# Patient Record
Sex: MALE | Race: WHITE | NOT HISPANIC OR LATINO | URBAN - METROPOLITAN AREA
[De-identification: names, ages, dates, MRNs, and addresses within clinical notes are randomized per-mention and may not be internally consistent; named-entity substitution may affect disease eponyms.]

---

## 2017-01-24 ENCOUNTER — EMERGENCY (EMERGENCY)
Facility: HOSPITAL | Age: 22
LOS: 1 days | Discharge: HOME | End: 2017-01-24
Attending: EMERGENCY MEDICINE | Admitting: EMERGENCY MEDICINE
Payer: COMMERCIAL

## 2017-01-24 VITALS
RESPIRATION RATE: 18 BRPM | OXYGEN SATURATION: 99 % | HEART RATE: 78 BPM | DIASTOLIC BLOOD PRESSURE: 80 MMHG | TEMPERATURE: 99 F | SYSTOLIC BLOOD PRESSURE: 142 MMHG

## 2017-01-24 VITALS
TEMPERATURE: 98 F | SYSTOLIC BLOOD PRESSURE: 141 MMHG | HEART RATE: 79 BPM | DIASTOLIC BLOOD PRESSURE: 97 MMHG | RESPIRATION RATE: 18 BRPM | OXYGEN SATURATION: 99 %

## 2017-01-24 DIAGNOSIS — R42 DIZZINESS AND GIDDINESS: ICD-10-CM

## 2017-01-24 DIAGNOSIS — R00.2 PALPITATIONS: ICD-10-CM

## 2017-01-24 PROCEDURE — 93010 ELECTROCARDIOGRAM REPORT: CPT | Mod: NC

## 2017-01-24 PROCEDURE — 99283 EMERGENCY DEPT VISIT LOW MDM: CPT | Mod: 25

## 2017-01-24 PROCEDURE — 93005 ELECTROCARDIOGRAM TRACING: CPT

## 2017-01-24 PROCEDURE — 99284 EMERGENCY DEPT VISIT MOD MDM: CPT | Mod: 25

## 2017-01-24 RX ORDER — MECLIZINE HCL 12.5 MG
25 TABLET ORAL ONCE
Qty: 0 | Refills: 0 | Status: COMPLETED | OUTPATIENT
Start: 2017-01-24 | End: 2017-01-24

## 2017-01-24 RX ORDER — MECLIZINE HCL 12.5 MG
1 TABLET ORAL
Qty: 9 | Refills: 0
Start: 2017-01-24 | End: 2017-01-27

## 2017-01-24 RX ADMIN — Medication 25 MILLIGRAM(S): at 02:06

## 2017-01-24 NOTE — ED PROVIDER NOTE - MEDICAL DECISION MAKING DETAILS
21 year old M presenting with acute onset dizziness this evening when laying flat, felt room spinning. Dizziness worse with movement. Associated nausea, palpitations. patient now only with dizziness. no fever or chills. no numbness weakness, vision changes. No recent illness. no chest pain or shortness of breath.   likely BPPPV given acute onset and worse with movement and otherwise healthy male. plan for antivert and reassess.

## 2017-01-24 NOTE — ED PROVIDER NOTE - INTERPRETATION
normal sinus rhythm, Normal axis, Normal VT interval and QRS complex. There are no acute ischemic ST or T-wave changes.

## 2017-01-24 NOTE — ED PROVIDER NOTE - PROGRESS NOTE DETAILS
Patients symptoms have improved. In bed and comfortable. Lengthy discussion regarding treatment, discharge instructions, and need for follow up. Patient understands and wishes to go home. LACY Thurman MD

## 2017-01-24 NOTE — ED ADULT NURSE NOTE - OBJECTIVE STATEMENT
22 y/o male presents to ED complaining of feeling his heart beating out of his chest and dizziness. States this started 2 hours ago. Denies SOB, chest pain, N/V/D, fever or chills. A&Ox4, VSS, ABD soft nondistended, skin warm dry and intact, lungs CTA. EKG obtained, placed on cardiac monitor.

## 2017-01-24 NOTE — ED ADULT NURSE NOTE - CHPI ED SYMPTOMS NEG
no fever/no syncope/no nausea/no back pain/no vomiting/no cough/no chills/no diaphoresis/no shortness of breath

## 2017-01-24 NOTE — ED PROVIDER NOTE - PHYSICAL EXAMINATION
Well Appearing, Nontoxic, NAD;  Symm Facies, PERRL 3mm, (-)Pallor, Anicteric, MMM;  No JVD/Bruits or stridor;  RRR w/o m/g/r;   CTAB w/o w/r/r;   Abd soft, nt/nd, +bs;  No CVAT;  No edema/calf tender;  No rash;  AOX3, Normal speech, normal strength/sensation/gait

## 2017-01-31 PROBLEM — Z00.00 ENCOUNTER FOR PREVENTIVE HEALTH EXAMINATION: Status: ACTIVE | Noted: 2017-01-31

## 2019-01-27 ENCOUNTER — EMERGENCY (EMERGENCY)
Facility: HOSPITAL | Age: 24
LOS: 1 days | Discharge: HOME | End: 2019-01-27
Attending: EMERGENCY MEDICINE
Payer: COMMERCIAL

## 2019-01-27 VITALS
SYSTOLIC BLOOD PRESSURE: 172 MMHG | RESPIRATION RATE: 17 BRPM | OXYGEN SATURATION: 97 % | HEART RATE: 95 BPM | TEMPERATURE: 99 F | DIASTOLIC BLOOD PRESSURE: 119 MMHG

## 2019-01-27 VITALS
DIASTOLIC BLOOD PRESSURE: 91 MMHG | RESPIRATION RATE: 18 BRPM | OXYGEN SATURATION: 97 % | HEART RATE: 96 BPM | SYSTOLIC BLOOD PRESSURE: 133 MMHG

## 2019-01-27 DIAGNOSIS — F39 UNSPECIFIED MOOD [AFFECTIVE] DISORDER: ICD-10-CM

## 2019-01-27 LAB
ALBUMIN SERPL ELPH-MCNC: 4.8 G/DL — SIGNIFICANT CHANGE UP (ref 3.3–5)
ALP SERPL-CCNC: 72 U/L — SIGNIFICANT CHANGE UP (ref 40–120)
ALT FLD-CCNC: 20 U/L — SIGNIFICANT CHANGE UP (ref 10–45)
ANION GAP SERPL CALC-SCNC: 15 MMOL/L — SIGNIFICANT CHANGE UP (ref 5–17)
APAP SERPL-MCNC: <15 UG/ML — SIGNIFICANT CHANGE UP (ref 10–30)
AST SERPL-CCNC: 18 U/L — SIGNIFICANT CHANGE UP (ref 10–40)
BASOPHILS # BLD AUTO: 0.1 K/UL — SIGNIFICANT CHANGE UP (ref 0–0.2)
BASOPHILS NFR BLD AUTO: 0.4 % — SIGNIFICANT CHANGE UP (ref 0–2)
BILIRUB SERPL-MCNC: 0.6 MG/DL — SIGNIFICANT CHANGE UP (ref 0.2–1.2)
BUN SERPL-MCNC: 13 MG/DL — SIGNIFICANT CHANGE UP (ref 7–23)
CALCIUM SERPL-MCNC: 9.6 MG/DL — SIGNIFICANT CHANGE UP (ref 8.4–10.5)
CHLORIDE SERPL-SCNC: 102 MMOL/L — SIGNIFICANT CHANGE UP (ref 96–108)
CO2 SERPL-SCNC: 21 MMOL/L — LOW (ref 22–31)
CREAT SERPL-MCNC: 0.95 MG/DL — SIGNIFICANT CHANGE UP (ref 0.5–1.3)
EOSINOPHIL # BLD AUTO: 0 K/UL — SIGNIFICANT CHANGE UP (ref 0–0.5)
EOSINOPHIL NFR BLD AUTO: 0.2 % — SIGNIFICANT CHANGE UP (ref 0–6)
ETHANOL SERPL-MCNC: SIGNIFICANT CHANGE UP MG/DL (ref 0–10)
GLUCOSE SERPL-MCNC: 116 MG/DL — HIGH (ref 70–99)
HCT VFR BLD CALC: 44.9 % — SIGNIFICANT CHANGE UP (ref 39–50)
HGB BLD-MCNC: 15.5 G/DL — SIGNIFICANT CHANGE UP (ref 13–17)
LYMPHOCYTES # BLD AUTO: 1.5 K/UL — SIGNIFICANT CHANGE UP (ref 1–3.3)
LYMPHOCYTES # BLD AUTO: 9.5 % — LOW (ref 13–44)
MCHC RBC-ENTMCNC: 28.8 PG — SIGNIFICANT CHANGE UP (ref 27–34)
MCHC RBC-ENTMCNC: 34.5 GM/DL — SIGNIFICANT CHANGE UP (ref 32–36)
MCV RBC AUTO: 83.7 FL — SIGNIFICANT CHANGE UP (ref 80–100)
MONOCYTES # BLD AUTO: 1.4 K/UL — HIGH (ref 0–0.9)
MONOCYTES NFR BLD AUTO: 9 % — SIGNIFICANT CHANGE UP (ref 2–14)
NEUTROPHILS # BLD AUTO: 12.5 K/UL — HIGH (ref 1.8–7.4)
NEUTROPHILS NFR BLD AUTO: 81 % — HIGH (ref 43–77)
PLATELET # BLD AUTO: 281 K/UL — SIGNIFICANT CHANGE UP (ref 150–400)
POTASSIUM SERPL-MCNC: 4 MMOL/L — SIGNIFICANT CHANGE UP (ref 3.5–5.3)
POTASSIUM SERPL-SCNC: 4 MMOL/L — SIGNIFICANT CHANGE UP (ref 3.5–5.3)
PROT SERPL-MCNC: 7.6 G/DL — SIGNIFICANT CHANGE UP (ref 6–8.3)
RBC # BLD: 5.37 M/UL — SIGNIFICANT CHANGE UP (ref 4.2–5.8)
RBC # FLD: 13.5 % — SIGNIFICANT CHANGE UP (ref 10.3–14.5)
SALICYLATES SERPL-MCNC: <2 MG/DL — LOW (ref 15–30)
SODIUM SERPL-SCNC: 138 MMOL/L — SIGNIFICANT CHANGE UP (ref 135–145)
WBC # BLD: 15.4 K/UL — HIGH (ref 3.8–10.5)
WBC # FLD AUTO: 15.4 K/UL — HIGH (ref 3.8–10.5)

## 2019-01-27 PROCEDURE — 99284 EMERGENCY DEPT VISIT MOD MDM: CPT

## 2019-01-27 PROCEDURE — 93005 ELECTROCARDIOGRAM TRACING: CPT

## 2019-01-27 PROCEDURE — 80307 DRUG TEST PRSMV CHEM ANLYZR: CPT

## 2019-01-27 PROCEDURE — 80053 COMPREHEN METABOLIC PANEL: CPT

## 2019-01-27 PROCEDURE — 85027 COMPLETE CBC AUTOMATED: CPT

## 2019-01-27 PROCEDURE — 90792 PSYCH DIAG EVAL W/MED SRVCS: CPT

## 2019-01-27 PROCEDURE — 99285 EMERGENCY DEPT VISIT HI MDM: CPT

## 2019-01-27 NOTE — ED BEHAVIORAL HEALTH ASSESSMENT NOTE - PREFERRED LANGUAGE
CAD (coronary artery disease)  stents  DM (diabetes mellitus)  pre  Fatty liver    Former smoker    Gastroesophageal reflux disease, esophagitis presence not specified    HLD (hyperlipidemia)    HTN (hypertension)    MI (myocardial infarction) English

## 2019-01-27 NOTE — ED PROVIDER NOTE - PROGRESS NOTE DETAILS
Patient seen by psych. Do not feel he needs 2pc psych admission. Offered voluntary psych admission, initially patient accepted, but then did not want to wait for bed. Psysch spoke again with patient and family, and they do not feel he is an acute threat to himself or others, and feels safe taking him home. Will dc with outpatient psych, and [facility] [place] [facility] - d/w psych attending, pt is unwilling to stay. wait for bed. Safe for DC though, will f/u Psych and restart psych meds. Parents at bedside, agree w plan. Will DC
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
## 2019-01-27 NOTE — ED PROVIDER NOTE - NSFOLLOWUPINSTRUCTIONS_ED_ALL_ED_FT
Take your Lexapro as prescribed.  Follow up with Psychiatrist as discussed next week.  Return to the ER for any concerns.

## 2019-01-27 NOTE — ED BEHAVIORAL HEALTH ASSESSMENT NOTE - CURRENT ACTIVE IDEATION
Vaccine Information Statement(s) was given today. This has been reviewed, questions answered, and verbal consent given by Patient for injection(s) and administration of Tetanus/Diphtheria/Pertussis (Tdap). Patient tolerated without incident. See immunization grid for documentation. None known
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
## 2019-01-27 NOTE — ED BEHAVIORAL HEALTH ASSESSMENT NOTE - DESCRIPTION
Denies college degree in economics; works at [employer]; live with family Vital Signs Last 24 Hrs  T(C): 37 (27 Jan 2019 14:33), Max: 37 (27 Jan 2019 14:33)  T(F): 98.6 (27 Jan 2019 14:33), Max: 98.6 (27 Jan 2019 14:33)  HR: 96 (27 Jan 2019 14:49) (95 - 96)  BP: 133/91 (27 Jan 2019 14:49) (133/91 - 172/119)  BP(mean): --  RR: 18 (27 Jan 2019 14:49) (17 - 18)  SpO2: 97% (27 Jan 2019 14:49) (97% - 97%) college degree in economics; works at [employer]; lives with family

## 2019-01-27 NOTE — ED PROVIDER NOTE - CARE PROVIDER_API CALL
Nathan Garcia), Psychiatry  [address] [place] [address]  Phone: [phone]  Fax: [phone]

## 2019-01-27 NOTE — ED BEHAVIORAL HEALTH ASSESSMENT NOTE - DIFFERENTIAL
MDD vs PDD  ?hypomania or mohit  Substance-induced mood disorder  BLPD vs avoidant personality disorder MDD vs PDD  ?hypomania or mohit  Substance-induced mood disorder  Generalized anxiety disorder vs social anxiety disorder  BLPD vs avoidant personality disorder MDD vs PDD  Substance-induced mood disorder  Generalized anxiety disorder vs social anxiety disorder  BLPD vs avoidant personality disorder

## 2019-01-27 NOTE — ED ADULT NURSE NOTE - OBJECTIVE STATEMENT
23 year old male BIB mother with depression and axiety; A&O; +SI with no plan or intent at this time; denies AVH; + THC use last Friday, social drinker, denies any other drug use;  denies Axis III; NKA. Pt states "I hate everything about myself" and states he has been having SI "almost every day since high school" with one prior attempt by "trying to jump from a balcony" when he was 16; is on Lexapro but is not currently seeing a psychiatrist or therapist, denies any prior admits; pt states he smoked some THC oil on Friday and his mother states this is part of when these feelings became worse, pt agrees somewhat but also says these feelings predate any drug use; upon arrival he is cooperative somewhat anxious with pressured speech. Security applied metal detection and mother took belongings. Continue to monitor.

## 2019-01-27 NOTE — ED BEHAVIORAL HEALTH ASSESSMENT NOTE - RISK ASSESSMENT
Pt is at mildly elevated risk for harm towards self/others and does not meet criteria for involuntary admission to inpatient psych. Pt w/o current active SIIP or HIIP but seeking treatment for current symptoms. Pt seeking voluntary admission to inpatient psychiatry. Pt is at elevated chronic risk for harm towards self/others. Pt w/o current active SIIP or HIIP but seeking treatment for depression and anxiety. Pt at this time does not meet criteria for involuntary admission to inpatient psych. Pt seeking outpatient psych tx and parents in agreement.

## 2020-01-16 ENCOUNTER — EMERGENCY (EMERGENCY)
Facility: HOSPITAL | Age: 25
LOS: 1 days | Discharge: PSYCHIATRIC OR SUBSTANCE USE FACILITY | End: 2020-01-16
Attending: EMERGENCY MEDICINE
Payer: COMMERCIAL

## 2020-01-16 ENCOUNTER — INPATIENT (INPATIENT)
Facility: HOSPITAL | Age: 25
LOS: 4 days | Discharge: HOME | End: 2020-01-21
Attending: PSYCHIATRY & NEUROLOGY | Admitting: PSYCHIATRY & NEUROLOGY
Payer: COMMERCIAL

## 2020-01-16 VITALS
SYSTOLIC BLOOD PRESSURE: 156 MMHG | OXYGEN SATURATION: 98 % | TEMPERATURE: 98 F | DIASTOLIC BLOOD PRESSURE: 102 MMHG | HEART RATE: 76 BPM | RESPIRATION RATE: 18 BRPM | WEIGHT: 315 LBS | HEIGHT: 75 IN

## 2020-01-16 VITALS — HEIGHT: 75 IN | TEMPERATURE: 98 F | WEIGHT: 315 LBS

## 2020-01-16 VITALS
HEART RATE: 79 BPM | TEMPERATURE: 99 F | RESPIRATION RATE: 18 BRPM | OXYGEN SATURATION: 96 % | DIASTOLIC BLOOD PRESSURE: 95 MMHG | SYSTOLIC BLOOD PRESSURE: 131 MMHG

## 2020-01-16 DIAGNOSIS — F33.2 MAJOR DEPRESSIVE DISORDER, RECURRENT SEVERE WITHOUT PSYCHOTIC FEATURES: ICD-10-CM

## 2020-01-16 DIAGNOSIS — F33.9 MAJOR DEPRESSIVE DISORDER, RECURRENT, UNSPECIFIED: ICD-10-CM

## 2020-01-16 LAB
ALBUMIN SERPL ELPH-MCNC: 4.6 G/DL — SIGNIFICANT CHANGE UP (ref 3.3–5)
ALP SERPL-CCNC: 91 U/L — SIGNIFICANT CHANGE UP (ref 40–120)
ALT FLD-CCNC: 22 U/L — SIGNIFICANT CHANGE UP (ref 10–45)
AMPHET UR-MCNC: NEGATIVE — SIGNIFICANT CHANGE UP
ANION GAP SERPL CALC-SCNC: 14 MMOL/L — SIGNIFICANT CHANGE UP (ref 5–17)
APAP SERPL-MCNC: <15 UG/ML — SIGNIFICANT CHANGE UP (ref 10–30)
APPEARANCE UR: CLEAR — SIGNIFICANT CHANGE UP
AST SERPL-CCNC: 16 U/L — SIGNIFICANT CHANGE UP (ref 10–40)
BACTERIA # UR AUTO: NEGATIVE — SIGNIFICANT CHANGE UP
BARBITURATES UR SCN-MCNC: NEGATIVE — SIGNIFICANT CHANGE UP
BASOPHILS # BLD AUTO: 0.07 K/UL — SIGNIFICANT CHANGE UP (ref 0–0.2)
BASOPHILS NFR BLD AUTO: 0.6 % — SIGNIFICANT CHANGE UP (ref 0–2)
BENZODIAZ UR-MCNC: NEGATIVE — SIGNIFICANT CHANGE UP
BILIRUB SERPL-MCNC: 0.6 MG/DL — SIGNIFICANT CHANGE UP (ref 0.2–1.2)
BILIRUB UR-MCNC: NEGATIVE — SIGNIFICANT CHANGE UP
BUN SERPL-MCNC: 11 MG/DL — SIGNIFICANT CHANGE UP (ref 7–23)
CALCIUM SERPL-MCNC: 9.8 MG/DL — SIGNIFICANT CHANGE UP (ref 8.4–10.5)
CHLORIDE SERPL-SCNC: 101 MMOL/L — SIGNIFICANT CHANGE UP (ref 96–108)
CO2 SERPL-SCNC: 22 MMOL/L — SIGNIFICANT CHANGE UP (ref 22–31)
COCAINE METAB.OTHER UR-MCNC: NEGATIVE — SIGNIFICANT CHANGE UP
COLOR SPEC: SIGNIFICANT CHANGE UP
CREAT SERPL-MCNC: 0.86 MG/DL — SIGNIFICANT CHANGE UP (ref 0.5–1.3)
DIFF PNL FLD: NEGATIVE — SIGNIFICANT CHANGE UP
EOSINOPHIL # BLD AUTO: 0.08 K/UL — SIGNIFICANT CHANGE UP (ref 0–0.5)
EOSINOPHIL NFR BLD AUTO: 0.7 % — SIGNIFICANT CHANGE UP (ref 0–6)
EPI CELLS # UR: 0 /HPF — SIGNIFICANT CHANGE UP
ETHANOL SERPL-MCNC: SIGNIFICANT CHANGE UP MG/DL (ref 0–10)
GLUCOSE SERPL-MCNC: 108 MG/DL — HIGH (ref 70–99)
GLUCOSE UR QL: NEGATIVE — SIGNIFICANT CHANGE UP
HCT VFR BLD CALC: 47.7 % — SIGNIFICANT CHANGE UP (ref 39–50)
HGB BLD-MCNC: 16.1 G/DL — SIGNIFICANT CHANGE UP (ref 13–17)
HYALINE CASTS # UR AUTO: 0 /LPF — SIGNIFICANT CHANGE UP (ref 0–2)
IMM GRANULOCYTES NFR BLD AUTO: 0.4 % — SIGNIFICANT CHANGE UP (ref 0–1.5)
KETONES UR-MCNC: NEGATIVE — SIGNIFICANT CHANGE UP
LEUKOCYTE ESTERASE UR-ACNC: NEGATIVE — SIGNIFICANT CHANGE UP
LYMPHOCYTES # BLD AUTO: 1.52 K/UL — SIGNIFICANT CHANGE UP (ref 1–3.3)
LYMPHOCYTES # BLD AUTO: 12.9 % — LOW (ref 13–44)
MCHC RBC-ENTMCNC: 28 PG — SIGNIFICANT CHANGE UP (ref 27–34)
MCHC RBC-ENTMCNC: 33.8 GM/DL — SIGNIFICANT CHANGE UP (ref 32–36)
MCV RBC AUTO: 83 FL — SIGNIFICANT CHANGE UP (ref 80–100)
METHADONE UR-MCNC: NEGATIVE — SIGNIFICANT CHANGE UP
MONOCYTES # BLD AUTO: 0.74 K/UL — SIGNIFICANT CHANGE UP (ref 0–0.9)
MONOCYTES NFR BLD AUTO: 6.3 % — SIGNIFICANT CHANGE UP (ref 2–14)
NEUTROPHILS # BLD AUTO: 9.33 K/UL — HIGH (ref 1.8–7.4)
NEUTROPHILS NFR BLD AUTO: 79.1 % — HIGH (ref 43–77)
NITRITE UR-MCNC: NEGATIVE — SIGNIFICANT CHANGE UP
NRBC # BLD: 0 /100 WBCS — SIGNIFICANT CHANGE UP (ref 0–0)
OPIATES UR-MCNC: NEGATIVE — SIGNIFICANT CHANGE UP
OXYCODONE UR-MCNC: NEGATIVE — SIGNIFICANT CHANGE UP
PCP SPEC-MCNC: SIGNIFICANT CHANGE UP
PCP UR-MCNC: NEGATIVE — SIGNIFICANT CHANGE UP
PH UR: 6 — SIGNIFICANT CHANGE UP (ref 5–8)
PLATELET # BLD AUTO: 289 K/UL — SIGNIFICANT CHANGE UP (ref 150–400)
POTASSIUM SERPL-MCNC: 4 MMOL/L — SIGNIFICANT CHANGE UP (ref 3.5–5.3)
POTASSIUM SERPL-SCNC: 4 MMOL/L — SIGNIFICANT CHANGE UP (ref 3.5–5.3)
PROT SERPL-MCNC: 8.1 G/DL — SIGNIFICANT CHANGE UP (ref 6–8.3)
PROT UR-MCNC: NEGATIVE — SIGNIFICANT CHANGE UP
RBC # BLD: 5.75 M/UL — SIGNIFICANT CHANGE UP (ref 4.2–5.8)
RBC # FLD: 12.6 % — SIGNIFICANT CHANGE UP (ref 10.3–14.5)
RBC CASTS # UR COMP ASSIST: 1 /HPF — SIGNIFICANT CHANGE UP (ref 0–4)
SALICYLATES SERPL-MCNC: <2 MG/DL — LOW (ref 15–30)
SODIUM SERPL-SCNC: 137 MMOL/L — SIGNIFICANT CHANGE UP (ref 135–145)
SP GR SPEC: 1.01 — SIGNIFICANT CHANGE UP (ref 1.01–1.02)
THC UR QL: POSITIVE
TSH SERPL-MCNC: 1.84 UIU/ML — SIGNIFICANT CHANGE UP (ref 0.27–4.2)
UROBILINOGEN FLD QL: NEGATIVE — SIGNIFICANT CHANGE UP
WBC # BLD: 11.79 K/UL — HIGH (ref 3.8–10.5)
WBC # FLD AUTO: 11.79 K/UL — HIGH (ref 3.8–10.5)
WBC UR QL: 1 /HPF — SIGNIFICANT CHANGE UP (ref 0–5)

## 2020-01-16 PROCEDURE — 99285 EMERGENCY DEPT VISIT HI MDM: CPT

## 2020-01-16 PROCEDURE — 80307 DRUG TEST PRSMV CHEM ANLYZR: CPT

## 2020-01-16 PROCEDURE — 84443 ASSAY THYROID STIM HORMONE: CPT

## 2020-01-16 PROCEDURE — 99284 EMERGENCY DEPT VISIT MOD MDM: CPT

## 2020-01-16 PROCEDURE — 85027 COMPLETE CBC AUTOMATED: CPT

## 2020-01-16 PROCEDURE — 81001 URINALYSIS AUTO W/SCOPE: CPT

## 2020-01-16 PROCEDURE — 93005 ELECTROCARDIOGRAM TRACING: CPT

## 2020-01-16 PROCEDURE — 80053 COMPREHEN METABOLIC PANEL: CPT

## 2020-01-16 RX ORDER — DIPHENHYDRAMINE HCL 50 MG
50 CAPSULE ORAL ONCE
Refills: 0 | Status: DISCONTINUED | OUTPATIENT
Start: 2020-01-16 | End: 2020-01-21

## 2020-01-16 RX ORDER — DIPHENHYDRAMINE HCL 50 MG
50 CAPSULE ORAL EVERY 6 HOURS
Refills: 0 | Status: DISCONTINUED | OUTPATIENT
Start: 2020-01-16 | End: 2020-01-21

## 2020-01-16 RX ORDER — HALOPERIDOL DECANOATE 100 MG/ML
5 INJECTION INTRAMUSCULAR EVERY 6 HOURS
Refills: 0 | Status: DISCONTINUED | OUTPATIENT
Start: 2020-01-16 | End: 2020-01-21

## 2020-01-16 RX ORDER — HALOPERIDOL DECANOATE 100 MG/ML
5 INJECTION INTRAMUSCULAR ONCE
Refills: 0 | Status: DISCONTINUED | OUTPATIENT
Start: 2020-01-16 | End: 2020-01-21

## 2020-01-16 RX ORDER — VENLAFAXINE HCL 75 MG
75 CAPSULE, EXT RELEASE 24 HR ORAL DAILY
Refills: 0 | Status: DISCONTINUED | OUTPATIENT
Start: 2020-01-16 | End: 2020-01-18

## 2020-01-16 NOTE — ED PROVIDER NOTE - CLINICAL SUMMARY MEDICAL DECISION MAKING FREE TEXT BOX
24M w/ worsening SI, no medical complaints, will eval w/ labs and consult psych 24M w/ worsening SI, no medical complaints, will eval w/ labs and consult psych    MIGUEL Pink MD: Pt is a 23 y/o male with PMH depression/anxiety on effexor rx'd by PMD, presenting w/ feelings of suicidal ideation ongoing x months, recently worsening with more concrete plans such as jumping off a bridge and using a helium mask to commit suicide. Never attempted suicide. Punched himself in the head today to try to 'make it all stop', no LOC, no endorsement of motor/neuro symptoms. No new stressors. Lives with family and feels supported but states these feelings are unexplained and he feels that they are more frequent and worsening. Plan: Psych clearance labs, Psych consult

## 2020-01-16 NOTE — ED BEHAVIORAL HEALTH ASSESSMENT NOTE - SUICIDE RISK FACTORS
Anhedonia/Mood Disorder current/past/Current mood episode/Hopelessness or despair/Alcohol/Substance abuse disorders

## 2020-01-16 NOTE — ED PROVIDER NOTE - NS ED ROS FT
Gen: No fever, normal appetite  Eyes: No eye irritation or discharge  ENT: No ear pain, congestion, sore throat  Resp: No cough or trouble breathing  Cardiovascular: No chest pain or palpitation  Gastroenteric: No nausea/vomiting, diarrhea, constipation  :  No change in urine output; no dysuria  MS: No joint or muscle pain  Skin: No rashes  Neuro: No headache; no abnormal movements  Psych: + SI, no HI/AH, no HI   Remainder negative, except as per the HPI

## 2020-01-16 NOTE — ED ADULT NURSE NOTE - OBJECTIVE STATEMENT
24 yr old pt BIB self and parents with SI; A&O; +SI, safe in hospital; denies AVH; denies ETOH, uses cannabis daily; denies Axis III. 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 24 yr old pt BIB self and parents with SI; A&O; +SI, safe in hospital; denies AVH; denies ETOH, "used to use cannabis daily but I cut down"; denies Axis III. 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

## 2020-01-16 NOTE — ED PROVIDER NOTE - PHYSICAL EXAMINATION
Gen: AAOX3, NAD  Head: NCAT  ENT: Airway patent, moist mucous membranes, nasal passageways clear  Cardiac: Normal rate, normal rhythm, no murmurs/rubs/gallops appreciated  Respiratory: Lungs CTA B/L  Gastrointestinal: Abdomen soft, nontender, nondistended, no rebound, no guarding   MSK: No gross abnormalities, FROM of all four extremities, no edema  HEME: Extremities warm, pulses intact and symmetrical in all four extremities  Skin: No rashes, no lesions  Neuro: No gross neurologic deficits, CN II-XII intact, no facial asymmetry, strength equal in all four extremities, no gait abnormality

## 2020-01-16 NOTE — ED ADULT NURSE NOTE - SUICIDE RISK FACTORS
Insomnia/Hopelessness or despair/Mood Disorder current/past/ADHD current/past/Current mood episode/Anhedonia

## 2020-01-16 NOTE — ED BEHAVIORAL HEALTH ASSESSMENT NOTE - SUMMARY
This is a 25 yo M with no PMH, no past psychiatric hospitalizations or diagnoses, significant past history of suicidal ideation and depression, presenting to ER for suicidality. Pt woke up this morning and was driving to work when he began feeling anger for having a mental illness and returned home from work. He was having strong suicidal ideation with plan but no intent, thinking about using a helium tank to suffocate himself or jump off a bridge. He then remained at home for a few hours and decided to come to the ED because he believed he would eventually kill himself if he did not seek help. Psychiatry consulted for suicidality and depression.    On evaluation, patient reported feeling in good health until 5th grade, during which his teacher bullied and mocked him. He soon lost interest in school and began to spend more time playing video games and watching TV. However, his academic performance did not suffer and he did well in school with minimal effort. During this time, he began to experience depressed mood every day for most of the day, significant anhedonia, sleep disturbance, guilt, concentration impairment and daily passive suicidal ideation with no intent but plans including using a helium tank and jumping off a bridge. He also harmed himself often by slapping himself in the head due to feeling a need to be punished. He also endorsed anxiety at this time. He also began to consume marijuana in binges, smoking daily for several weeks at a time with months in between binges. He acknowledges he abuses THC but does not intend to completely stop, although he states he can decrease his frequency of use. He was on an SSRI medication for about a year but stopped taking it in January 2018. Approximately a year later, he experienced a manic episode- 7 days of sleeplessness, increased energy, pressured speech, grandiosity. He notes some improvement in depressive symptoms from the SSRI but felt it made him listless and robotic. He has been on Effexor for the last year and notes similar response, preferring not to take medication. He has been attending CBT psychotherapy weekly for the last 8 months but finds it ineffective. His depression had some mild improvement during college since he had work to occupy his time and goals to accomplish, but depression became more severe after he graduated. He endorses vague possible visual hallucination but notes it may be due to his frequent marijuana use. Patient believes he will kill himself if he does not seek psychiatric treatment. There is family psychiatric history of alcoholism. Denies current SI/HI.

## 2020-01-16 NOTE — ED BEHAVIORAL HEALTH ASSESSMENT NOTE - DETAILS
Researched methods of suicide extensively but never acted on them Alcoholism As above To 2N Plan communicated

## 2020-01-16 NOTE — ED PROVIDER NOTE - OBJECTIVE STATEMENT
24M hx depression/anxiety on effexor rx'd by PMD, presenting w/ feelings of suicidal ideation ongoing x months, recently worsening with more concrete plans such as jumping off a bridge and using a helium mask to commit suicide. Never attempted suicide. Punched himself in the head today to try to 'make it all stop', no LOC, no endorsement of motor/neuro symptoms. No new stressors. Lives with family and feels supported but states these feelings are unexplained and he feels that they are more frequent and worsening.

## 2020-01-16 NOTE — ED BEHAVIORAL HEALTH ASSESSMENT NOTE - HPI (INCLUDE ILLNESS QUALITY, SEVERITY, DURATION, TIMING, CONTEXT, MODIFYING FACTORS, ASSOCIATED SIGNS AND SYMPTOMS)
This is a 23 yo M with no PMH, no past psychiatric hospitalizations or diagnoses, significant past history of suicidal ideation and depression, presenting to ER for suicidality. Pt woke up this morning and was driving to work when he began feeling anger for having a mental illness and returned home from work. He was having strong suicidal ideation with plan but no intent, thinking about using a helium tank to suffocate himself or jump off a bridge. He then remained at home for a few hours and decided to come to the ED because he believed he would eventually kill himself if he did not seek help. Psychiatry consulted for suicidality and depression.    On evaluation, patient reported feeling in good health until 5th grade, during which his teacher bullied and mocked him. He soon lost interest in school and began to spend more time playing video games and watching TV. However, his academic performance did not suffer and he did well in school with minimal effort. During this time, he began to experience depressed mood every day for most of the day, significant anhedonia, sleep disturbance, guilt, concentration impairment and daily passive suicidal ideation with no intent but plans including using a helium tank and jumping off a bridge. He also harmed himself often by slapping himself in the head due to feeling a need to be punished. He also endorsed anxiety at this time. He also began to consume marijuana in binges, smoking daily for several weeks at a time with months in between binges. He acknowledges he abuses THC but does not intend to completely stop, although he states he can decrease his frequency of use. He was on an SSRI medication for about a year but stopped taking it in January 2018. Approximately a year later, he experienced a manic episode- 7 days of sleeplessness, increased energy, pressured speech, grandiosity. He notes some improvement in depressive symptoms from the SSRI but felt it made him listless and robotic. He has been on Effexor for the last year and notes similar response, preferring not to take medication. He has been attending CBT psychotherapy weekly for the last 8 months but finds it ineffective. His depression had some mild improvement during college since he had work to occupy his time and goals to accomplish, but depression became more severe after he graduated. He endorses vague possible visual hallucination but notes it may be due to his frequent marijuana use. Patient believes he will kill himself if he does not seek psychiatric treatment. There is family psychiatric history of alcoholism. Denies current SI/HI.    Collateral from father Nando Echevarria () was in accord with patient's report. They stated he was brought to the ER 1 year ago for anger and property destruction at home after 2 of his friends cut off communication with him. He was advised to follow up with [facility] outpatient but did not do so. This is a 25 yo CM patient with no PMH, no past psychiatric hospitalizations or diagnoses, significant past history of suicidal ideation and depression, presenting to ER for suicidality. Pt woke up this morning and was driving to work when he began feeling anger for having a mental illness and returned home from work. He was having strong suicidal ideation with plan but no intent, thinking about using a helium tank to suffocate himself or jump off a bridge. He then remained at home for a few hours and decided to come to the ED because he believed he would eventually kill himself if he did not seek help. Psychiatry consulted for suicidality and depression.    On evaluation, patient reported feeling in good health until 5th grade, during which his teacher bullied and mocked him. He soon lost interest in school and began to spend more time playing video games and watching TV. However, his academic performance did not suffer and he did well in school with minimal effort. During this time, he began to experience depressed mood every day for most of the day, significant anhedonia, sleep disturbance, guilt, concentration impairment and daily passive suicidal ideation with no intent but plans including using a helium tank and jumping off a bridge. He also harmed himself often by slapping himself in the head due to feeling a need to be punished. He also endorsed anxiety at this time. He also began to consume marijuana in binges, smoking daily for several weeks at a time with months in between binges. He acknowledges he abuses THC but does not intend to completely stop, although he states he can decrease his frequency of use. He was on an SSRI medication for about a year but stopped taking it in January 2018. Approximately a year later, he experienced a manic episode- 7 days of sleeplessness, increased energy, pressured speech, grandiosity. He notes some improvement in depressive symptoms from the SSRI but felt it made him listless and robotic. He has been on Effexor for the last year and notes similar response, preferring not to take medication. He has been attending CBT psychotherapy weekly for the last 8 months but finds it ineffective. His depression had some mild improvement during college since he had work to occupy his time and goals to accomplish, but depression became more severe after he graduated. He endorses vague possible visual hallucination but notes it may be due to his frequent marijuana use. Patient believes he will kill himself if he does not seek psychiatric treatment. There is family psychiatric history of alcoholism. Denies current SI/HI.    Collateral from father Nando Echevarria () was in accord with patient's report. They stated he was brought to the ER 1 year ago for anger and property destruction at home after 2 of his friends cut off communication with him. He was advised to follow up with [facility] outpatient but did not do so.

## 2020-01-16 NOTE — ED PROVIDER NOTE - PROGRESS NOTE DETAILS
Jean PGY3: pt pending psych eval MIGUEL Pink MD: Pt seen and eval by Psychiatry. Pt TBA for inpt psych voluntary admission. Medically clear, awaiting placement.

## 2020-01-16 NOTE — ED BEHAVIORAL HEALTH ASSESSMENT NOTE - SUICIDAL IDEATION DETAILS
Daily passive ideation for several years, plans include helium tank suffocation and jumping off bridge

## 2020-01-16 NOTE — ED BEHAVIORAL HEALTH ASSESSMENT NOTE - RISK ASSESSMENT
Patient denies current SI so has low acute risk but chronic risk elevated due to significant history of suicidal ideation with no intent but plan Low Acute Suicide Risk

## 2020-01-16 NOTE — ED BEHAVIORAL HEALTH ASSESSMENT NOTE - OTHER PAST PSYCHIATRIC HISTORY (INCLUDE DETAILS REGARDING ONSET, COURSE OF ILLNESS, INPATIENT/OUTPATIENT TREATMENT)
Long history of depression and some anxiety since 5th grade, one manic episode in January 2019, substance use THC, self-harm via striking, near daily suicidal ideation with plan but no intent

## 2020-01-16 NOTE — ED BEHAVIORAL HEALTH ASSESSMENT NOTE - ACCOMPANIED BY
Refill requested for:       Esomeprazole 40 mg  Losartan 50 mg  meloxicam 15 mg  Last filled on:     11/10/2017 # 90   Last office visit:     01/05/2018      Please advise on refills.     Family member

## 2020-01-16 NOTE — CHART NOTE - NSCHARTNOTEFT_GEN_A_CORE
EMERGENCY ROOM SOCIAL WORK: Chart reviewed for inpatient psych transfer. Patient is a 23 y/o, single, male, presented c/o SI. Patient evaluated by [facility] and in agreement with voluntary psych admission. Admitting dx: MDD. Legal Status: Voluntary.    [facility] contacted [facility] for bed availability. EKG and Legals faxed to [phone]. Awaiting psych bed assignment. EMERGENCY ROOM SOCIAL WORK: Chart reviewed for inpatient psych transfer. Patient is a 25 y/o, single, male, presented c/o SI. Patient evaluated by [facility] Psych and in agreement with voluntary psych admission. Admitting dx: MDD. Legal Status: Voluntary.    [facility] contacted [facility] for bed availability. EKG and Legals faxed to [phone]. Awaiting psych bed assignment. Patient accepted to [address] [place] [address] with Dr. Alvarado (ph. [phone]). Medical clearance pending transfer. [facility] email sent. Handoff provided to  for completion of process.

## 2020-01-16 NOTE — ED BEHAVIORAL HEALTH ASSESSMENT NOTE - DESCRIPTION
No interventions, patient vitals recorded. None Patient spends most of his time alone at home in his room, lost all interest in hobbies including video games

## 2020-01-16 NOTE — ED BEHAVIORAL HEALTH ASSESSMENT NOTE - COMMENTS ON SUICIDE RISK/PROTECTIVE FACTORS:
Patient is at elevated risk of suicide due to long history of passive, occasionally active SI and self-harm

## 2020-01-16 NOTE — ED BEHAVIORAL HEALTH ASSESSMENT NOTE - CASE SUMMARY
This is a 25 yo M with no PMH, no past psychiatric hospitalizations or diagnoses, significant past history of suicidal ideation and depression, presenting to ER for suicidality. Pt woke up this morning and was driving to work when he began feeling anger for having a mental illness and returned home from work. He was having strong suicidal ideation with plan but no intent, thinking about using a helium tank to suffocate himself or jump off a bridge. He then remained at home for a few hours and decided to come to the ED because he believed he would eventually kill himself if he did not seek help. Psychiatry consulted for suicidality and depression.    On evaluation, patient reported feeling in good health until 5th grade, during which his teacher bullied and mocked him. He soon lost interest in school and began to spend more time playing video games and watching TV. However, his academic performance did not suffer and he did well in school with minimal effort. During this time, he began to experience depressed mood every day for most of the day, significant anhedonia, sleep disturbance, guilt, concentration impairment and daily passive suicidal ideation with no intent but plans including using a helium tank and jumping off a bridge. He also harmed himself often by slapping himself in the head due to feeling a need to be punished. He also endorsed anxiety at this time. He also began to consume marijuana in binges, smoking daily for several weeks at a time with months in between binges. He acknowledges he abuses THC but does not intend to completely stop, although he states he can decrease his frequency of use. He was on an SSRI medication for about a year but stopped taking it in January 2018. Approximately a year later, he experienced a manic episode- 7 days of sleeplessness, increased energy, pressured speech, grandiosity. He notes some improvement in depressive symptoms from the SSRI but felt it made him listless and robotic. He has been on Effexor for the last year and notes similar response, preferring not to take medication. He has been attending CBT psychotherapy weekly for the last 8 months but finds it ineffective. His depression had some mild improvement during college since he had work to occupy his time and goals to accomplish, but depression became more severe after he graduated. He endorses vague possible visual hallucination but notes it may be due to his frequent marijuana use. Patient believes he will kill himself if he does not seek psychiatric treatment. There is family psychiatric history of alcoholism. Denies current SI/HI. This is a 24-y.o. CM patient with no PMH, no past psychiatric hospitalizations or diagnoses, but PPH significant for depression and suicidal ideations, presenting to ER for suicidality. Pt woke up this morning and was driving to work when he began feeling anger for having a mental illness and returned home from work. He was having strong suicidal ideation with plan but no intent, thinking about using a helium tank to suffocate himself or jump off a bridge. He then remained at home for a few hours and decided to come to the ED because he believed he would eventually kill himself if he did not seek help. Psychiatry consulted for suicidality and depression.    On evaluation, patient reported feeling in good health until 5th grade, during which his teacher bullied and mocked him. He soon lost interest in school and began to spend more time playing video games and watching TV. However, his academic performance did not suffer and he did well in school with minimal effort. During this time, he began to experience depressed mood every day for most of the day, significant anhedonia, sleep disturbance, guilt, concentration impairment and daily passive suicidal ideation with no intent but plans including using a helium tank and jumping off a bridge. He also harmed himself often by slapping himself in the head due to feeling a need to be punished. He also endorsed anxiety at this time. He also began to consume marijuana in binges, smoking daily for several weeks at a time with months in between binges. He acknowledges he abuses THC but does not intend to completely stop, although he states he can decrease his frequency of use. He was on an SSRI medication for about a year but stopped taking it in January 2018. Approximately a year later, he experienced a manic episode- 7 days of sleeplessness, increased energy, pressured speech, grandiosity. He notes some improvement in depressive symptoms from the SSRI but felt it made him listless and robotic. He has been on Effexor for the last year and notes similar response, preferring not to take medication. He has been attending CBT psychotherapy weekly for the last 8 months but finds it ineffective. His depression had some mild improvement during college since he had work to occupy his time and goals to accomplish, but depression became more severe after he graduated. He endorses vague possible visual hallucination but notes it may be due to his frequent marijuana use. Patient believes he will kill himself if he does not seek psychiatric treatment. There is family psychiatric history of alcoholism. Denies current SI/HI.    I have seen and evaluated this patient myself. Chart, labs, meds reviewed. I agree with trainee's assessment and plan, which has been amended where appropriate.

## 2020-01-17 PROCEDURE — 99232 SBSQ HOSP IP/OBS MODERATE 35: CPT

## 2020-01-17 RX ORDER — IBUPROFEN 200 MG
600 TABLET ORAL ONCE
Refills: 0 | Status: COMPLETED | OUTPATIENT
Start: 2020-01-17 | End: 2020-01-17

## 2020-01-17 RX ORDER — IBUPROFEN 200 MG
400 TABLET ORAL EVERY 8 HOURS
Refills: 0 | Status: DISCONTINUED | OUTPATIENT
Start: 2020-01-17 | End: 2020-01-21

## 2020-01-17 RX ADMIN — Medication 600 MILLIGRAM(S): at 15:51

## 2020-01-17 RX ADMIN — Medication 75 MILLIGRAM(S): at 08:51

## 2020-01-17 RX ADMIN — Medication 600 MILLIGRAM(S): at 14:14

## 2020-01-18 PROCEDURE — 99231 SBSQ HOSP IP/OBS SF/LOW 25: CPT

## 2020-01-18 RX ORDER — INFLUENZA VIRUS VACCINE 15; 15; 15; 15 UG/.5ML; UG/.5ML; UG/.5ML; UG/.5ML
0.5 SUSPENSION INTRAMUSCULAR ONCE
Refills: 0 | Status: COMPLETED | OUTPATIENT
Start: 2020-01-18 | End: 2020-01-18

## 2020-01-18 RX ORDER — VENLAFAXINE HCL 75 MG
112.5 CAPSULE, EXT RELEASE 24 HR ORAL DAILY
Refills: 0 | Status: DISCONTINUED | OUTPATIENT
Start: 2020-01-19 | End: 2020-01-19

## 2020-01-18 RX ORDER — VENLAFAXINE HCL 75 MG
37.5 CAPSULE, EXT RELEASE 24 HR ORAL ONCE
Refills: 0 | Status: COMPLETED | OUTPATIENT
Start: 2020-01-18 | End: 2020-01-18

## 2020-01-18 RX ADMIN — Medication 30 MILLILITER(S): at 22:10

## 2020-01-18 RX ADMIN — INFLUENZA VIRUS VACCINE 0.5 MILLILITER(S): 15; 15; 15; 15 SUSPENSION INTRAMUSCULAR at 10:02

## 2020-01-18 RX ADMIN — Medication 75 MILLIGRAM(S): at 09:27

## 2020-01-18 RX ADMIN — Medication 37.5 MILLIGRAM(S): at 13:08

## 2020-01-18 NOTE — CHART NOTE - NSCHARTNOTEFT_GEN_A_CORE
Screening Medical Evaluation  Patient Admitted from: [facility] ED    [facility] admitting diagnosis: Recurrent major depressive disorder    PAST MEDICAL & SURGICAL HISTORY:  No pertinent past medical history  Extraction of Left sided [place] teeth (2015)      ALLERGIES  - NKDA  - No food allergies    INTOLERANCES      SOCIAL HX  Admits to Tobacco use--vape pen, frequently throughout day  Admits to Drug use-- +Marijuana, daily use x 3-5 weeks and then takes several weeks "off"  Denies EtOH    FAMILY HISTORY:  No pertinent family history    MEDICATIONS  (STANDING):  venlafaxine XR 75 milliGRAM(s) Oral daily    MEDICATIONS  (PRN):  diphenhydrAMINE 50 milliGRAM(s) Oral every 6 hours PRN agitation  diphenhydrAMINE   Injectable 50 milliGRAM(s) IntraMuscular once PRN agitation  haloperidol     Tablet 5 milliGRAM(s) Oral every 6 hours PRN agitation  haloperidol    Injectable 5 milliGRAM(s) IntraMuscular once PRN agitation  ibuprofen  Tablet. 400 milliGRAM(s) Oral every 8 hours PRN Mild Pain (1 - 3), Moderate Pain (4 - 6)  LORazepam     Tablet 2 milliGRAM(s) Oral every 6 hours PRN agitation  LORazepam   Injectable 2 milliGRAM(s) IntraMuscular once PRN agitation      Vital Signs Last 24 Hrs  T(C): 37 (2020 08:23), Max: 37 (2020 08:23)  T(F): 98.6 (2020 08:23), Max: 98.6 (2020 08:23)  HR: 72 (2020 08:23) (72 - 72)  BP: 139/94 (2020 08:23) (139/94 - 139/94)  RR: --  SpO2: --  CAPILLARY BLOOD GLUCOSE    GENERAL: NAD, well-developed, well-nourished, asleep upon arrival  HEAD:  Atraumatic, Normocephalic  EYES: EOMI, PERRLA, conjunctiva and sclera clear  NECK: Supple  CHEST/LUNG: Clear to auscultation bilaterally; No wheeze, crackles, rhonchi  HEART: Regular rate and rhythm; +s1 and +s2, No murmurs, rubs, or gallops  ABDOMEN: Soft, Nontender, Nondistended; normoactive Bowel sounds present  EXTREMITIES:  2+ Peripheral Pulses, No clubbing, or edema  PSYCH: AAOx3, cooperative  NEUROLOGY: non-focal  SKIN: No rashes or lesions      LABS:                        16.1   11.79 )-----------( 289      ( 2020 12:11 )             47.7     -    137  |  101  |  11  ----------------------------<  108<H>  4.0   |  22  |  0.86    Ca    9.8      2020 12:11    TPro  8.1  /  Alb  4.6  /  TBili  0.6  /  DBili  x   /  AST  16  /  ALT  22  /  AlkPhos  91            Urinalysis Basic - ( 2020 13:43 )    Color: Light Yellow / Appearance: Clear / S.015 / pH: x  Gluc: x / Ketone: Negative  / Bili: Negative / Urobili: Negative   Blood: x / Protein: Negative / Nitrite: Negative   Leuk Esterase: Negative / RBC: 1 /hpf / WBC 1 /HPF   Sq Epi: x / Non Sq Epi: 0 /hpf / Bacteria: Negative        RADIOLOGY & ADDITIONAL TESTS:    Assessment and Plan:  Pt is a 24y.o male with no significant PMHx who presents to [facility] from [facility] ED for further evaluation and management of Recurrent major depressive disorder. Pt is agreeable to flu vaccine while admitted here. Pt currently denies any medical concern including fever, chills, visual changes (double vision, scotomas, vision loss), HA, SOB, CP, Abdominal pain, n/v, constipation, diarrhea, urinary symptoms (dysuria, hematuria, incontinence, frequency/urgency, urethral discharge) or changes in bowel movements. Physical Exam unremarkable.     Labs personally reviewed- CBC, CMP, & TSH WNL with exception of elevated WBC of 11.95. UA WNL and UTox positive for THC.  EKG personally reviewed showing--- NSR 65bpm with QTc level of 413ms. T-wave inversions lead V1. No evidence of ischemia/infarct or arrhythmias. No peaked/flattened t-waves.     #) Recurrent major depressive disorder: management as per psychiatry team

## 2020-01-19 PROCEDURE — 99231 SBSQ HOSP IP/OBS SF/LOW 25: CPT

## 2020-01-19 RX ORDER — VENLAFAXINE HCL 75 MG
150 CAPSULE, EXT RELEASE 24 HR ORAL DAILY
Refills: 0 | Status: DISCONTINUED | OUTPATIENT
Start: 2020-01-20 | End: 2020-01-21

## 2020-01-19 RX ADMIN — Medication 112.5 MILLIGRAM(S): at 08:53

## 2020-01-20 PROCEDURE — 99231 SBSQ HOSP IP/OBS SF/LOW 25: CPT

## 2020-01-20 RX ORDER — VENLAFAXINE HCL 75 MG
1 CAPSULE, EXT RELEASE 24 HR ORAL
Qty: 0 | Refills: 0 | DISCHARGE

## 2020-01-20 RX ORDER — VENLAFAXINE HCL 75 MG
1 CAPSULE, EXT RELEASE 24 HR ORAL
Qty: 14 | Refills: 0
Start: 2020-01-20 | End: 2020-02-02

## 2020-01-20 RX ADMIN — Medication 50 MILLIGRAM(S): at 23:30

## 2020-01-20 RX ADMIN — Medication 150 MILLIGRAM(S): at 09:04

## 2020-01-21 VITALS — TEMPERATURE: 98 F

## 2020-01-21 PROCEDURE — 99238 HOSP IP/OBS DSCHRG MGMT 30/<: CPT

## 2020-01-21 RX ADMIN — Medication 150 MILLIGRAM(S): at 08:46

## 2020-01-27 ENCOUNTER — OUTPATIENT (OUTPATIENT)
Dept: OUTPATIENT SERVICES | Facility: HOSPITAL | Age: 25
LOS: 1 days | Discharge: HOME | End: 2020-01-27

## 2020-02-07 ENCOUNTER — INPATIENT (INPATIENT)
Facility: HOSPITAL | Age: 25
LOS: 3 days | Discharge: PSYCHIATRIC OR SUBSTANCE USE FACILITY | End: 2020-02-11
Attending: HOSPITALIST | Admitting: HOSPITALIST
Payer: COMMERCIAL

## 2020-02-07 VITALS
DIASTOLIC BLOOD PRESSURE: 100 MMHG | TEMPERATURE: 98 F | SYSTOLIC BLOOD PRESSURE: 155 MMHG | HEART RATE: 114 BPM | OXYGEN SATURATION: 100 % | RESPIRATION RATE: 17 BRPM

## 2020-02-07 DIAGNOSIS — T50.902A POISONING BY UNSPECIFIED DRUGS, MEDICAMENTS AND BIOLOGICAL SUBSTANCES, INTENTIONAL SELF-HARM, INITIAL ENCOUNTER: ICD-10-CM

## 2020-02-07 LAB
ALBUMIN SERPL ELPH-MCNC: 4.7 G/DL — SIGNIFICANT CHANGE UP (ref 3.3–5)
ALP SERPL-CCNC: 103 U/L — SIGNIFICANT CHANGE UP (ref 40–120)
ALT FLD-CCNC: 29 U/L — SIGNIFICANT CHANGE UP (ref 4–41)
AMPHET UR-MCNC: NEGATIVE — SIGNIFICANT CHANGE UP
ANION GAP SERPL CALC-SCNC: 14 MMO/L — SIGNIFICANT CHANGE UP (ref 7–14)
APAP SERPL-MCNC: < 15 UG/ML — LOW (ref 15–25)
AST SERPL-CCNC: 25 U/L — SIGNIFICANT CHANGE UP (ref 4–40)
BARBITURATES UR SCN-MCNC: NEGATIVE — SIGNIFICANT CHANGE UP
BASOPHILS # BLD AUTO: 0.05 K/UL — SIGNIFICANT CHANGE UP (ref 0–0.2)
BASOPHILS NFR BLD AUTO: 0.2 % — SIGNIFICANT CHANGE UP (ref 0–2)
BENZODIAZ UR-MCNC: POSITIVE — SIGNIFICANT CHANGE UP
BILIRUB SERPL-MCNC: 0.9 MG/DL — SIGNIFICANT CHANGE UP (ref 0.2–1.2)
BUN SERPL-MCNC: 12 MG/DL — SIGNIFICANT CHANGE UP (ref 7–23)
CALCIUM SERPL-MCNC: 9.6 MG/DL — SIGNIFICANT CHANGE UP (ref 8.4–10.5)
CANNABINOIDS UR-MCNC: POSITIVE — SIGNIFICANT CHANGE UP
CHLORIDE SERPL-SCNC: 100 MMOL/L — SIGNIFICANT CHANGE UP (ref 98–107)
CO2 SERPL-SCNC: 23 MMOL/L — SIGNIFICANT CHANGE UP (ref 22–31)
COCAINE METAB.OTHER UR-MCNC: NEGATIVE — SIGNIFICANT CHANGE UP
CREAT SERPL-MCNC: 1.02 MG/DL — SIGNIFICANT CHANGE UP (ref 0.5–1.3)
EOSINOPHIL # BLD AUTO: 0 K/UL — SIGNIFICANT CHANGE UP (ref 0–0.5)
EOSINOPHIL NFR BLD AUTO: 0 % — SIGNIFICANT CHANGE UP (ref 0–6)
ETHANOL BLD-MCNC: < 10 MG/DL — SIGNIFICANT CHANGE UP
GLUCOSE SERPL-MCNC: 125 MG/DL — HIGH (ref 70–99)
HCT VFR BLD CALC: 48.3 % — SIGNIFICANT CHANGE UP (ref 39–50)
HGB BLD-MCNC: 16.2 G/DL — SIGNIFICANT CHANGE UP (ref 13–17)
IMM GRANULOCYTES NFR BLD AUTO: 0.8 % — SIGNIFICANT CHANGE UP (ref 0–1.5)
LYMPHOCYTES # BLD AUTO: 0.79 K/UL — LOW (ref 1–3.3)
LYMPHOCYTES # BLD AUTO: 3.8 % — LOW (ref 13–44)
MAGNESIUM SERPL-MCNC: 2.7 MG/DL — HIGH (ref 1.6–2.6)
MCHC RBC-ENTMCNC: 28.1 PG — SIGNIFICANT CHANGE UP (ref 27–34)
MCHC RBC-ENTMCNC: 33.5 % — SIGNIFICANT CHANGE UP (ref 32–36)
MCV RBC AUTO: 83.7 FL — SIGNIFICANT CHANGE UP (ref 80–100)
METHADONE UR-MCNC: NEGATIVE — SIGNIFICANT CHANGE UP
MONOCYTES # BLD AUTO: 0.98 K/UL — HIGH (ref 0–0.9)
MONOCYTES NFR BLD AUTO: 4.8 % — SIGNIFICANT CHANGE UP (ref 2–14)
NEUTROPHILS # BLD AUTO: 18.62 K/UL — HIGH (ref 1.8–7.4)
NEUTROPHILS NFR BLD AUTO: 90.4 % — HIGH (ref 43–77)
NRBC # FLD: 0 K/UL — SIGNIFICANT CHANGE UP (ref 0–0)
OPIATES UR-MCNC: NEGATIVE — SIGNIFICANT CHANGE UP
OXYCODONE UR-MCNC: NEGATIVE — SIGNIFICANT CHANGE UP
PCP UR-MCNC: POSITIVE — SIGNIFICANT CHANGE UP
PHOSPHATE SERPL-MCNC: 1.3 MG/DL — LOW (ref 2.5–4.5)
PLATELET # BLD AUTO: 300 K/UL — SIGNIFICANT CHANGE UP (ref 150–400)
PMV BLD: 9.1 FL — SIGNIFICANT CHANGE UP (ref 7–13)
POTASSIUM SERPL-MCNC: 3.5 MMOL/L — SIGNIFICANT CHANGE UP (ref 3.5–5.3)
POTASSIUM SERPL-SCNC: 3.5 MMOL/L — SIGNIFICANT CHANGE UP (ref 3.5–5.3)
PROT SERPL-MCNC: 8.5 G/DL — HIGH (ref 6–8.3)
RBC # BLD: 5.77 M/UL — SIGNIFICANT CHANGE UP (ref 4.2–5.8)
RBC # FLD: 12.8 % — SIGNIFICANT CHANGE UP (ref 10.3–14.5)
SALICYLATES SERPL-MCNC: < 5 MG/DL — LOW (ref 15–30)
SODIUM SERPL-SCNC: 137 MMOL/L — SIGNIFICANT CHANGE UP (ref 135–145)
TSH SERPL-MCNC: 2.04 UIU/ML — SIGNIFICANT CHANGE UP (ref 0.27–4.2)
WBC # BLD: 20.6 K/UL — HIGH (ref 3.8–10.5)
WBC # FLD AUTO: 20.6 K/UL — HIGH (ref 3.8–10.5)

## 2020-02-07 PROCEDURE — 99291 CRITICAL CARE FIRST HOUR: CPT | Mod: 25

## 2020-02-07 PROCEDURE — 99255 IP/OBS CONSLTJ NEW/EST HI 80: CPT

## 2020-02-07 PROCEDURE — 71045 X-RAY EXAM CHEST 1 VIEW: CPT | Mod: 26

## 2020-02-07 PROCEDURE — 73030 X-RAY EXAM OF SHOULDER: CPT | Mod: 26,RT

## 2020-02-07 RX ORDER — MIDAZOLAM HYDROCHLORIDE 1 MG/ML
4 INJECTION, SOLUTION INTRAMUSCULAR; INTRAVENOUS ONCE
Refills: 0 | Status: DISCONTINUED | OUTPATIENT
Start: 2020-02-07 | End: 2020-02-07

## 2020-02-07 RX ORDER — SOD SULF/SODIUM/NAHCO3/KCL/PEG
4000 SOLUTION, RECONSTITUTED, ORAL ORAL ONCE
Refills: 0 | Status: COMPLETED | OUTPATIENT
Start: 2020-02-07 | End: 2020-02-07

## 2020-02-07 RX ORDER — FENTANYL CITRATE 50 UG/ML
0.5 INJECTION INTRAVENOUS
Qty: 2500 | Refills: 0 | Status: DISCONTINUED | OUTPATIENT
Start: 2020-02-07 | End: 2020-02-09

## 2020-02-07 RX ORDER — PROPOFOL 10 MG/ML
150 INJECTION, EMULSION INTRAVENOUS ONCE
Refills: 0 | Status: COMPLETED | OUTPATIENT
Start: 2020-02-07 | End: 2020-02-07

## 2020-02-07 RX ORDER — FENTANYL CITRATE 50 UG/ML
200 INJECTION INTRAVENOUS ONCE
Refills: 0 | Status: DISCONTINUED | OUTPATIENT
Start: 2020-02-07 | End: 2020-02-07

## 2020-02-07 RX ORDER — PROPOFOL 10 MG/ML
100 INJECTION, EMULSION INTRAVENOUS ONCE
Refills: 0 | Status: COMPLETED | OUTPATIENT
Start: 2020-02-07 | End: 2020-02-07

## 2020-02-07 RX ORDER — SODIUM CHLORIDE 9 MG/ML
1000 INJECTION, SOLUTION INTRAVENOUS ONCE
Refills: 0 | Status: COMPLETED | OUTPATIENT
Start: 2020-02-07 | End: 2020-02-07

## 2020-02-07 RX ORDER — CHLORHEXIDINE GLUCONATE 213 G/1000ML
15 SOLUTION TOPICAL EVERY 12 HOURS
Refills: 0 | Status: DISCONTINUED | OUTPATIENT
Start: 2020-02-07 | End: 2020-02-09

## 2020-02-07 RX ORDER — MIDAZOLAM HYDROCHLORIDE 1 MG/ML
6 INJECTION, SOLUTION INTRAMUSCULAR; INTRAVENOUS ONCE
Refills: 0 | Status: DISCONTINUED | OUTPATIENT
Start: 2020-02-07 | End: 2020-02-07

## 2020-02-07 RX ORDER — PROPOFOL 10 MG/ML
80 INJECTION, EMULSION INTRAVENOUS ONCE
Refills: 0 | Status: COMPLETED | OUTPATIENT
Start: 2020-02-07 | End: 2020-02-07

## 2020-02-07 RX ORDER — SUCCINYLCHOLINE CHLORIDE 100 MG/5ML
150 SYRINGE (ML) INTRAVENOUS ONCE
Refills: 0 | Status: COMPLETED | OUTPATIENT
Start: 2020-02-07 | End: 2020-02-07

## 2020-02-07 RX ORDER — OXYCODONE HYDROCHLORIDE 5 MG/1
5 TABLET ORAL ONCE
Refills: 0 | Status: DISCONTINUED | OUTPATIENT
Start: 2020-02-07 | End: 2020-02-07

## 2020-02-07 RX ORDER — HEPARIN SODIUM 5000 [USP'U]/ML
7500 INJECTION INTRAVENOUS; SUBCUTANEOUS EVERY 8 HOURS
Refills: 0 | Status: DISCONTINUED | OUTPATIENT
Start: 2020-02-07 | End: 2020-02-10

## 2020-02-07 RX ORDER — CHLORHEXIDINE GLUCONATE 213 G/1000ML
1 SOLUTION TOPICAL
Refills: 0 | Status: DISCONTINUED | OUTPATIENT
Start: 2020-02-07 | End: 2020-02-11

## 2020-02-07 RX ORDER — MIDAZOLAM HYDROCHLORIDE 1 MG/ML
0.02 INJECTION, SOLUTION INTRAMUSCULAR; INTRAVENOUS
Qty: 100 | Refills: 0 | Status: DISCONTINUED | OUTPATIENT
Start: 2020-02-07 | End: 2020-02-08

## 2020-02-07 RX ADMIN — Medication 4000 MILLILITER(S): at 22:38

## 2020-02-07 RX ADMIN — FENTANYL CITRATE 7.6 MICROGRAM(S)/KG/HR: 50 INJECTION INTRAVENOUS at 21:00

## 2020-02-07 RX ADMIN — MIDAZOLAM HYDROCHLORIDE 3.04 MG/KG/HR: 1 INJECTION, SOLUTION INTRAMUSCULAR; INTRAVENOUS at 21:30

## 2020-02-07 RX ADMIN — PROPOFOL 150 MILLIGRAM(S): 10 INJECTION, EMULSION INTRAVENOUS at 20:30

## 2020-02-07 RX ADMIN — MIDAZOLAM HYDROCHLORIDE 6 MILLIGRAM(S): 1 INJECTION, SOLUTION INTRAMUSCULAR; INTRAVENOUS at 21:45

## 2020-02-07 RX ADMIN — Medication 150 MILLIGRAM(S): at 20:30

## 2020-02-07 RX ADMIN — MIDAZOLAM HYDROCHLORIDE 4 MILLIGRAM(S): 1 INJECTION, SOLUTION INTRAMUSCULAR; INTRAVENOUS at 21:15

## 2020-02-07 RX ADMIN — FENTANYL CITRATE 200 MICROGRAM(S): 50 INJECTION INTRAVENOUS at 21:00

## 2020-02-07 RX ADMIN — PROPOFOL 100 MILLIGRAM(S): 10 INJECTION, EMULSION INTRAVENOUS at 21:00

## 2020-02-07 RX ADMIN — SODIUM CHLORIDE 1000 MILLILITER(S): 9 INJECTION, SOLUTION INTRAVENOUS at 19:28

## 2020-02-07 RX ADMIN — MIDAZOLAM HYDROCHLORIDE 4 MILLIGRAM(S): 1 INJECTION, SOLUTION INTRAMUSCULAR; INTRAVENOUS at 21:55

## 2020-02-07 RX ADMIN — OXYCODONE HYDROCHLORIDE 5 MILLIGRAM(S): 5 TABLET ORAL at 19:28

## 2020-02-07 NOTE — ED PROVIDER NOTE - NS ED ROS FT
General: denies fever, chills, weight loss/weight gain.  HENT: denies nasal congestion, sore throat, rhinorrhea, ear pain  Eyes: denies visual changes, blurred vision, eye discharge, eye redness  Neck: denies neck pain, neck swelling  CV: denies chest pain, palpitations  Resp: denies difficulty breathing, cough  Abdominal: denies nausea, vomiting, diarrhea, abdominal pain, blood in stool, dark stool  MSK: denies muscle aches, bony pain, leg pain, leg swelling  Neuro: denies headaches, numbness, tingling, dizziness, lightheadedness.  Skin: denies rashes, cuts, bruises  Hematologic: denies unexplained bruises  PSYCH: No SI/HI currently

## 2020-02-07 NOTE — H&P ADULT - NSHPPHYSICALEXAM_GEN_ALL_CORE
Vital Signs Last 24 Hrs  T(C): 36.6 (07 Feb 2020 23:24), Max: 36.6 (07 Feb 2020 16:56)  T(F): 97.9 (07 Feb 2020 23:24), Max: 97.9 (07 Feb 2020 23:24)  HR: 95 (07 Feb 2020 22:45) (90 - 115)  BP: 117/86 (07 Feb 2020 23:24) (111/91 - 192/135)  BP(mean): 96 (07 Feb 2020 23:24) (85 - 149)  RR: 20 (07 Feb 2020 23:24) (17 - 20)  SpO2: 100% (07 Feb 2020 23:24) (100% - 100%) Vital Signs Last 24 Hrs  T(C): 36.6 (07 Feb 2020 23:24), Max: 36.6 (07 Feb 2020 16:56)  T(F): 97.9 (07 Feb 2020 23:24), Max: 97.9 (07 Feb 2020 23:24)  HR: 95 (07 Feb 2020 22:45) (90 - 115)  BP: 117/86 (07 Feb 2020 23:24) (111/91 - 192/135)  BP(mean): 96 (07 Feb 2020 23:24) (85 - 149)  RR: 20 (07 Feb 2020 23:24) (17 - 20)  SpO2: 100% (07 Feb 2020 23:24) (100% - 100%)    CONSTITUTIONAL: Intubated, sedated.  HEAD: No evidence of trauma. Structures WNL.  EYES: +PERRL. No scleral icterus. No conjunctival injection.  ENT: +Endotracheal tube.  NECK: Supple.   RESPIRATORY: Ventilatory breath sounds.  CARDIOVASCULAR: +S1/S2. Tachycardia. No murmurs, rubs, or gallops.   GASTROINTESTINAL: Soft, nondistended. +BS.    EXTREMITY: No LE swelling or edema. EXTs warm to touch.  MUSCULOSKELETAL: Intubated/sedated.  DERMATOLOGICAL: No abnormal rashes or lesions.  NEUROLOGICAL: Intubated/sedated.

## 2020-02-07 NOTE — ED PROCEDURE NOTE - CPROC ED GASTRIC INTUB DETAIL1
The orogastric tube (see size above) was inserted via the anatomic location./Placement was confirmed by aspiration of gastric secretions./Bowel sounds present to 4 quadrants./Gastric tube connected to low continuous suction.

## 2020-02-07 NOTE — H&P ADULT - ATTENDING COMMENTS
24 M with depression and anxiety here after suicide attempt by ingestion venlafaxine and melatonin, possible seizure related to this and dislocated shoulder joint, intubated for shoulder joint reduction and whole bowel irrigation given ingestion.  Started on polyethylene glycol, abdomen soft.  QRS within normal range.  Will get serial EKGs, serial labs, monitor for serotonin syndrome.  f/u tox and ortho reccs.      This patient is critically ill and required frequent bedside visits with therapy change. I have personally provided 35 minutes of critical care time concurrently with the resident/fellow. This time excludes time spent on separate procedures and time spent teaching. I have reviewed the resident/fellow’s documentation and I agree with the assessment and plan of care except as noted.

## 2020-02-07 NOTE — H&P ADULT - ASSESSMENT
24M PMHx depression and anxiety presents with suicide attempt via overdose on Venlafaxine and Melatonin, admitted to MICU for respiratory support and monitoring.    #Neuro  - High clinical suspicion of seizure due to Effexor OD given leukocytosis + anterior shoulder dislocation   - S/p intubation, sedation on propofol and versed    #Respiratory  - Intubated; Vent settings , RR 20, FiO2 40, PEEP 5    #Cardiovascular  - Hypertensive and tachycardic on presentation  - Currently hemodynamically stable  - EKG -     #Gastrointestinal  - S/p gastric lavage 2/2 ingestion of Effexor and melatonin  - NPO    #/Renal  - No acute issues  - Cr 1.02    #Heme  - Leukocytosis to 20 likely 2/2 to seizure  - Will continue tomonitor    #Endocrine  - No acute issues  - F/u TSH    #ID  - No acute issues    #Psych  - Formal consult in AM  - Constant observation 2/2 agitation and pulling at trach  - Zyprexa 2.5 PO/IM Q6H PRN for agitation    #MSK  - R anterior shoulder reduction likely 2/2 seizure  - Reduced in ED    #Prophylactic Measures  - DVT prophylaxis:  - Diet: NPO  - Dispo: Pending clinical status    #Discharge Planning  Transition of Care Status:  1. Name of PCP: None on file  2. PCP contacted on admission: [  ] Y     [  ] N  3. PCP contacted at discharge: [  ] Y     [  ] N  4. Post-discharge appointment date and location:  5. Summary of handoff given to PCP: 24M PMHx depression and anxiety presents with suicide attempt via overdose on Venlafaxine and Melatonin, admitted to MICU for respiratory support and monitoring.    #Neuro  - High clinical suspicion of seizure due to Effexor OD given leukocytosis + anterior shoulder dislocation  - S/p intubation, sedation on propofol and versed  - Toxicology recs appreciated: monitor for seizures, if present --> benzodiazepines    #Respiratory  - Intubated; Vent settings , RR 20, FiO2 40, PEEP 5    #Cardiovascular  - Hypertensive and tachycardic on presentation  - Currently hemodynamically stable  - Continuous monitoring and serial EKGs given possibility of arrythmia; per Toxicology, if QRS > 120 --> 50 MLif arrhythmia --> lidocaine  - Continuous core temperature monitoring given possibility of serotonin syndrome    #Gastrointestinal  - S/p gastric lavage 2/2 ingestion of Effexor and melatonin  - Per Toxicology, c/w whole bowel irrigation/PEG (1-2L/hour) until rectal effluent is clear  - NPO    #/Renal  - No acute issues  - Cr 1.02    #Heme  - Leukocytosis to 20 likely 2/2 to seizure  - Trend CBC    #Endocrine  - No acute issues  - F/u TSH    #ID  - No acute issues    #Psych  - Formal consult in AM  - Constant observation 2/2 agitation and pulling at trach  - Zyprexa 2.5 PO/IM Q6H PRN for agitation    #MSK  - R anterior shoulder reduction likely 2/2 seizure; Ortho consulted  - Per Toxicology, assess for rigidity given risk of serotonin syndrome, treat with benzos if present    #Prophylactic Measures  - DVT prophylaxis:  - Diet: NPO  - Dispo: Pending clinical status    #Discharge Planning  Transition of Care Status:  1. Name of PCP: None on file  2. PCP contacted on admission: [  ] Y     [  ] N  3. PCP contacted at discharge: [  ] Y     [  ] N  4. Post-discharge appointment date and location:  5. Summary of handoff given to PCP: 24M PMHx depression and anxiety presents with suicide attempt via overdose on Venlafaxine and Melatonin, admitted to MICU for respiratory support and monitoring.    #Neuro  - High clinical suspicion of seizure due to Effexor OD given leukocytosis + anterior shoulder dislocation; will add on prolaction  - S/p intubation, sedation on propofol and versed  - Toxicology recs appreciated: monitor for seizures, if present --> benzodiazepines  - serotonin syndrome also a concern given high dose ingestion of SSRI, will monitor for hyperthermia.  - Formal psych consult in AM  - Zyprexa 2.5 PO/IM Q6H PRN for agitation    #Respiratory  - Intubated; Vent settings , RR 20, FiO2 40, PEEP 5  - will obtain ABG and adjust as needed.    #Cardiovascular  - Hypertensive and tachycardic on presentation  - Currently hemodynamically stable  - Continuous monitoring and serial EKGs given possibility of arrythmia; per Toxicology, if QRS > 120 --> 50 meQ NaHCO3  - Because effexor is a Na and K channel blocker, high concern for ventricular arrhythmia; per tox will give lidocaine if needed  - Continuous core temperature monitoring given possibility of serotonin syndrome    #Gastrointestinal  - S/p gastric lavage 2/2 ingestion of Effexor and melatonin  - Per Toxicology, c/w whole bowel irrigation/PEG (1-2L/hour) until rectal effluent is clear  - NPO except meds    #/Renal  - No acute issues  - Cr 1.02    #Heme  - Leukocytosis to 20 likely 2/2 to seizure  - Trend CBC    #Endocrine  - No acute issues  - F/u TSH    #ID  - No acute issues; leukocytosis likely reactive    #MSK  - R anterior shoulder reduction likely 2/2 seizure; Ortho consulted, appreciate recs  - Per Toxicology, assess for rigidity given risk of serotonin syndrome, treat with benzos if present    #Prophylactic Measures  - DVT prophylaxis: SQH  - Diet: NPO  - Dispo: Pending clinical status    #Discharge Planning  Transition of Care Status:  1. Name of PCP: None on file  2. PCP contacted on admission: [  ] Y     [  ] N  3. PCP contacted at discharge: [  ] Y     [  ] N  4. Post-discharge appointment date and location:  5. Summary of handoff given to PCP: 24M PMHx depression and anxiety presents with suicide attempt via overdose on Venlafaxine and Melatonin, admitted to MICU for respiratory support and monitoring.    #Neuro  - High clinical suspicion of seizure due to Effexor OD given leukocytosis + anterior shoulder dislocation; will add on prolaction  - S/p intubation, sedation on propofol and versed  - Toxicology recs appreciated: monitor for seizures, if present --> benzodiazepines  - serotonin syndrome also a concern given high dose ingestion of SSRI, will monitor for hyperthermia.  - Formal psych consult in AM  - Zyprexa 2.5 PO/IM Q6H PRN for agitation    #Respiratory  - Intubated; Vent settings , RR 20, FiO2 40, PEEP 5  - will obtain ABG and adjust as needed.    #Cardiovascular  - Hypertensive and tachycardic on presentation  - Currently hemodynamically stable  - Continuous monitoring and serial EKGs given possibility of arrythmia; per Toxicology, if QRS > 120 --> 50 meQ NaHCO3  - Because effexor is a Na and K channel blocker, high concern for ventricular arrhythmia; per tox will give lidocaine if needed  - Continuous core temperature monitoring given possibility of serotonin syndrome    #Gastrointestinal  - S/p gastric lavage 2/2 ingestion of Effexor and melatonin  - Per Toxicology, c/w whole bowel irrigation/PEG (1-2L/hour) until rectal effluent is clear  - NPO except meds    #/Renal  - No acute issues  - Cr 1.02    #Heme  - Leukocytosis to 20 likely 2/2 to seizure  - Trend CBC    #Endocrine  - No acute issues  - F/u TSH    #ID  - No acute issues; leukocytosis likely reactive    #MSK  - R anterior shoulder reduction likely 2/2 seizure, reduced in ED  - Per Toxicology, assess for rigidity given risk of serotonin syndrome, treat with benzos if present    #Prophylactic Measures  - DVT prophylaxis: SQH  - Diet: NPO  - Dispo: Pending clinical status    #Discharge Planning  Transition of Care Status:  1. Name of PCP: None on file  2. PCP contacted on admission: [  ] Y     [  ] N  3. PCP contacted at discharge: [  ] Y     [  ] N  4. Post-discharge appointment date and location:  5. Summary of handoff given to PCP: 24M PMHx depression and anxiety presents with suicide attempt via overdose on Venlafaxine and Melatonin, admitted to MICU for respiratory support and monitoring.    #Neuro  - High clinical suspicion of seizure due to Effexor OD given leukocytosis + anterior shoulder dislocation; will add on prolaction  - S/p intubation, sedation on propofol and versed  - Toxicology recs appreciated: monitor for seizures, if present --> benzodiazepines  - serotonin syndrome also a concern given high dose ingestion of SSRI, will monitor for hyperthermia.  - Formal psych consult in AM  - Zyprexa 2.5 PO/IM Q6H PRN for agitation    #Respiratory  - Intubated; Vent settings , RR 20, FiO2 40, PEEP 5  - will obtain ABG and adjust as needed.    #Cardiovascular  - Hypertensive and tachycardic on presentation  - Currently hemodynamically stable  - Continuous monitoring and serial EKGs given possibility of arrythmia; per Toxicology, if QRS > 120 --> 50 meQ NaHCO3  - Because effexor is a Na and K channel blocker, high concern for ventricular arrhythmia; per tox will give lidocaine if needed  - Continuous core temperature monitoring given possibility of serotonin syndrome    #Gastrointestinal  - S/p gastric lavage 2/2 ingestion of Effexor and melatonin  - Per Toxicology, c/w whole bowel irrigation/PEG (1-2L/hour) until rectal effluent is clear  - NPO except meds    #/Renal  - No acute issues  - Cr 1.02  - Monitor urinary output    #Heme  - Leukocytosis to 20 likely 2/2 to seizure  - Trend CBC    #Endocrine  - No acute issues  - F/u TSH    #ID  - No acute issues; leukocytosis likely reactive 2/2 seizure  - Monitor vitals    #MSK  - R anterior shoulder reduction likely 2/2 seizure, reduced in ED. Per ortho, maintain R shoulder in sling  - F/u CT RUE  - Per Toxicology, assess for rigidity given risk of serotonin syndrome, treat with benzos if present    #Prophylactic Measures  - DVT prophylaxis: SQH  - Diet: NPO  - Dispo: Pending clinical status    #Discharge Planning  Transition of Care Status:  1. Name of PCP: None on file  2. PCP contacted on admission: [  ] Y     [  ] N  3. PCP contacted at discharge: [  ] Y     [  ] N  4. Post-discharge appointment date and location:  5. Summary of handoff given to PCP: 24M PMHx depression and anxiety presents with suicide attempt via overdose on Venlafaxine and Melatonin, admitted to MICU for respiratory support and monitoring.    #Neuro  - High clinical suspicion of seizure due to Effexor OD given leukocytosis + anterior shoulder dislocation; will add on prolaction  - S/p intubation, sedation on propofol and versed  - Toxicology recs appreciated: monitor for seizures, if present --> benzodiazepines  - serotonin syndrome also a concern given high dose ingestion of SSRI, will monitor for hyperthermia.  - Formal psych consult in AM  - Zyprexa 2.5 PO/IM Q6H PRN for agitation    #Respiratory  - Intubated; Vent settings , RR 20, FiO2 40, PEEP 5  - will obtain ABG and adjust as needed.    #Cardiovascular  - Hypertensive and tachycardic on presentation  - Currently hemodynamically stable  - Continuous monitoring and serial EKGs given possibility of arrythmia; per Toxicology, if QRS > 120 --> 50 meQ NaHCO3  - Because effexor is a Na and K channel blocker, high concern for ventricular arrhythmia; per tox will give lidocaine if needed  - Continuous core temperature monitoring given possibility of serotonin syndrome    #Gastrointestinal  - S/p gastric lavage 2/2 ingestion of Effexor and melatonin  - Per Toxicology, c/w whole bowel irrigation/PEG (1-2L/hour) until rectal effluent is clear  - NPO except meds    #/Renal  - No acute issues  - Cr 1.02  - Monitor urinary output    #Heme  - Leukocytosis to 20 likely 2/2 to seizure  - Trend CBC    #Endocrine  - No acute issues  - TSH 1.84    #ID  - No acute issues; leukocytosis likely reactive 2/2 seizure  - Monitor vitals    #MSK  - R anterior shoulder reduction likely 2/2 seizure, reduced in ED. Per ortho, maintain R shoulder in sling  - F/u CT RUE  - Per Toxicology, assess for rigidity given risk of serotonin syndrome, treat with benzos if present    #Prophylactic Measures  - DVT prophylaxis: SQH  - Diet: NPO  - Dispo: Pending clinical status    #Discharge Planning  Transition of Care Status:  1. Name of PCP: None on file  2. PCP contacted on admission: [  ] Y     [  ] N  3. PCP contacted at discharge: [  ] Y     [  ] N  4. Post-discharge appointment date and location:  5. Summary of handoff given to PCP:

## 2020-02-07 NOTE — H&P ADULT - HISTORY OF PRESENT ILLNESS
24M PMHx depression and anxiety present s/p suicide attempt. Patient intubated and sedated at time of assessment. Per ED documentation, patient states that on the afternoon of admission, he ingested approximately 15-18 Venlafaxine 150 mg tablets and 15 Melatonin 5 mg pills in his home for the purpose of ending his life. He went to sleep and awoke at a later time, at which point he attempted unsuccessfully to induce emesis to remove the pills from his stomach. At the time of evaluation prior to intubation, patient stated that he was no longer suicidal; however, of note, patient has had previous ED visit in January for worsening suicidal ideation with concrete mechanisms.    In the ED: /100, , RR 17, O2 Sat 100 on RA, T97.8. Patient became more hypertensive to SBP 190s but then pressure and HR decreased to WNL (-130s, HR 90s). WBC 20s, Phos 1.3 24M PMHx depression and anxiety present s/p suicide attempt. Patient intubated and sedated at time of assessment. Per ED documentation, patient states that on the afternoon of admission, he ingested approximately 15-18 Venlafaxine 150 mg tablets and 15 Melatonin 5 mg pills in his home for the purpose of ending his life. He went to sleep and awoke at a later time, at which point he attempted unsuccessfully to induce emesis to remove the pills from his stomach. At the time of evaluation prior to intubation, patient stated that he was no longer suicidal; however, of note, patient has had previous ED visit in January for worsening suicidal ideation with concrete mechanisms.    In the ED: /100, , RR 17, O2 Sat 100 on RA, T97.8. Patient became more hypertensive to SBP 190s but then pressure and HR decreased to WNL (-130s, HR 90s). WBC 20s, Phos 1.3. Toxicology + for benzos, cannabinoids, and phencyclidine. Xray - R anterior shoulder dislocation, reduced in ED. Patient given LR 1L, oxycodone 5; intubated, s/p propofol 230 mg, midazolam 14 mg, fentanyl 200 mcg. Patient s/p gastric lavage. Toxicology consulted, recommended continuous cardiac monitoring and serial EKGs to screen for arrhythmias/lidocaine for arrhythmias, continuous core temperature monitoring for serotonin syndrome/ice immersion for hyperthermia, monitoring for rigidity or seizures/benzos, and continuous whole bowel irrigation at 1-2L/hr until rectal effluent clear.

## 2020-02-07 NOTE — ED PROVIDER NOTE - PROGRESS NOTE DETAILS
DD ED ATTG:  after recc from tox to initiate WBI given likely sz (elevated WBC, shoulder dislocation) in setting of venlafaxine OD; needing to intubate patient given also needing to sedate pt for shoulder reduction; informed consent obtained from patient for this procedure - risks of aspiration, infection discussed - pt intubated with glide scope on first pass using propofol for sedation and succinylcholine.  Pt became somewhat agitated and confused after propofol; possibly a paradoxical reaction, especially when trying to sedate pt after intubation; pt biting tube, bite block placed and then pt vomited despite large doses of sedation.  Ultimately max dose fentanyl and max dose versed worked for adequate sedation with versed pushes of 10mg.  Shortly after intubation pt's shoulder was reduced by me with good clinical and xray reduction.

## 2020-02-07 NOTE — ED PROVIDER NOTE - ATTENDING CONTRIBUTION TO CARE
24M p/w suicide attempt, pt took 15 x 150mg of effexor and 15 x 3mg melatonin in an attempt to kill himself.  Never did that before.  Feeling depression and anxiety, which has been going on a long time.  Pt c/o R shoulder pain, "I think it's dislocated" after moving it while on EMS ride, difficulty moving it.  MO called EMS.  No alcohol or other drugs.  Pt takes effexor for depression; new psychiatrist, doesn't remember, name.  C/o R shoulder pain, denies SI at present.  tried to force vomiting bile came up x 1.  No SOB.  Feeling safe at home.  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  PSHX - wisdom teeth.  no T.  All - NKA.  Pt in mod distress R shoulder pain, may have a shoulder dislocation;  Rx percocet for pain, check psych labs. tox eval.  EKG SR at 100 no yong no std no twi.  pr 154 qtc 456.  Will get R shoulder xray, rx pain meds for now, psych eval, tox eval. 24M p/w suicide attempt, pt took 15 x 150mg of effexor and 15 x 3mg melatonin in an attempt to kill himself.  Never did that before.  Feeling depression and anxiety, which has been going on a long time.  Pt c/o R shoulder pain, "I think it's dislocated" after moving it while on EMS ride, difficulty moving it.  MO called EMS.  No alcohol or other drugs.  Pt takes effexor for depression; new psychiatrist, doesn't remember, name.  C/o R shoulder pain, denies SI at present.  tried to force vomiting bile came up x 1.  No SOB.  Feeling safe at home.  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  PSHX - wisdom teeth.  no T.  All - NKA.  Pt in mod distress R shoulder pain, may have a shoulder dislocation;  Rx percocet for pain, check psych labs. tox eval.  EKG SR at 100 no yong no std no twi.  pr 154 qtc 456.  Will get R shoulder xray, rx pain meds for now, psych eval, tox eval.  VS:  tachycardia, HTN    GEN - NAD; appears depressed;   A+O x3 Obese.  HEAD - NC/AT     ENT - PEERL, EOMI, mucous membranes   dry , no discharge      NECK: Neck supple, non-tender without lymphadenopathy, no masses, no JVD  PULM - CTA b/l,  symmetric breath sounds  COR -  normal heart sounds    ABD - , ND, NT, soft,  BACK - no CVA tenderness, nontender spine     EXTREMS - no edema, no deformity, warm and well perfused  except R shoulder decr ROM, lateral stepoff from AC joint, anterior pec fullness.  Unable to int/ext rotate shoulder.  Skin normal there.  Distal elbow and wrist no deformity; distal hand NVT intact.   SKIN - no rash    or bruising      NEUROLOGIC - alert, face symmetric, speech fluent, sensation nl, motor no focal deficit.

## 2020-02-07 NOTE — CONSULT NOTE ADULT - PROBLEM SELECTOR RECOMMENDATION 9
-    -continuous cardiac monitoring and serial ECGs  -for QRS >120msec, please treat with boluses of 50 mEq sodium bicarbonate IV until QRS narrows  -for ventricular dysrhythmias, lidocaine is antidysrhythmic of choice  -continuous core temperature monitoring  -if temperature becomes >105 degrees F, please cool aggressively with ice immersion  -please monitor for rigidity and treat with benzodiazepines as needed for rigidity of extremities  -benzodiazepines as needed for seizures  -continue whole bowel irrigation or polyethylene glycol at 1-2 L/hr until rectal effluent is clear    Case discussed with ED and ICU teams.  Please page toxicology with any questions at [phone].

## 2020-02-07 NOTE — CONSULT NOTE ADULT - ATTENDING COMMENTS
MD Munson:  patient seen and evaluated with the fellow.  I was present for key portions of the history & physical, and I agree with the impression & plan.  23 yo M, who presents to the ED < 6 hrs s/p intentional venlafaxine OD.  Reports [id number].  Patient weight 150kg.  VS initially tachy.  Of note patient presented with a dislocated shoulder, which is very concerning for seizure.  This in turn is concerning for a larger-than-reported ingestion.  As such, given that this is an extended-release ingestion < 6 hrs old, we would advocate for Gi decontamination (whole bowel irrigation most appropriate).  However, his dislocated shoulder and large body habitus pose several problems.  1) the longer it is dislocated, the more difficult it will be to relocate; 2) delays in GI decontamination at the expense of a shoulder reduction (due to failed attempts with intraarticular block &/or conscious sedation) may result in more significant toxicity and delayed efficacy of GI decontamination.  In this situation it is appropriate to perform RSI, as it will serve multiple purposes 1) protect his airway should venlafaxine toxicity worsen (seizures, wide-complex tachycardia, cardiovascular collapse), 2) allow for more aggressive GI decontamination, 3) simultaneously allow for shoulder reduction, 4) allow for all of the above to occur in a timely manner.

## 2020-02-07 NOTE — ED ADULT NURSE REASSESSMENT NOTE - NS ED NURSE REASSESS COMMENT FT1
Pt stable on medications, no changes in respiratory status on ventilator. Indwelling rawls catheter placed, flexi-seal placed, and go-lytely medication started via OG tube.

## 2020-02-07 NOTE — H&P ADULT - NSHPREVIEWOFSYSTEMS_GEN_ALL_CORE
Unable to assess because patient intubated and sedated. Unable to obtain because patient intubated and sedated.

## 2020-02-07 NOTE — ED PROVIDER NOTE - PHYSICAL EXAMINATION
General appearance: diaphoretic  Eyes: anicteric sclerae, moist conjunctivae; no lid-lag; Pupils equal, round and reactive to light; Extraocular muscles intact  HENT: Atraumatic; oropharynx clear with moist mucous membranes and no mucosal ulcerations; normal hard and soft palate; no pharyngeal erythema or exudate. no miosis or mydriasis of the pupils.   Neck: Trachea midline; Full range of motion, supple; no thyromegaly or lymphadenopathy  Pulm: Lungs clear to auscultation bilaterally, with normal respiratory effort and no intercostal retractions; normal work of breathing  CV: tachycardic. Regular rhythm.   Abdomen: Soft, non-tender, non-distended; no masses or hepatosplenomegaly.  Extremities: No peripheral edema or extremity lymphadenopathy. 5/5 strength in all four extremities.  Skin: Normal temperature, turgor and texture; no rash, ulcers or subcutaneous nodules  Psych: Appropriate affect, cooperative; alert and oriented to person, place and time

## 2020-02-07 NOTE — ED ADULT TRIAGE NOTE - BP NONINVASIVE DIASTOLIC (MM HG)
Chief Complaint:  Mavis Matthews is here today for Toe Pain    Concerns:  Pt states she slipped in the bathtub about 4 weeks ago and injured her right big toe, a couple days later is noticed some pain in the right toe.  She is still having intermittent pain in right big toe.    Medications: medications verified and updated  Refills needed today?  NO  Preferred pharmacy added   Patient has Latex allergy or sensitivity  Tobacco history: verified    Patient would like communication of their results via:      Cell Phone:   Telephone Information:   Mobile [phone]     Okay to leave a message containing results? Yes  Health Maintenance Due   Topic Date Due   • Diabetes Eye Exam  08[birth year]01/1964   • Breast Cancer Screening  08/01/2000   • Osteoporosis Screening  08/01/2010   • Colorectal Cancer Screening-Colonoscopy  12/06/2014   • Pneumococcal Vaccine 65+ Low/Medium Risk (2 of 2 - PCV13) 02/10/2018                                   100

## 2020-02-07 NOTE — ED ADULT TRIAGE NOTE - CHIEF COMPLAINT QUOTE
Pt brought in from home by EMS after SI attempt. Pt states he took about 18 150mg Effexor  and 10 3mg Melatonin at around 1300. Pt states "I feel numb" PMH depression/ anxiety. FS by . Pt denies HI/AH/VH. Pt respirations even and unlabored in triage

## 2020-02-07 NOTE — PROCEDURE NOTE - NSTRACHINTUBMED_RESP_A_CORE
midazolam injectable/succinylcholine injectable/fentaNYL injectable/propofol injectable/propofol infusion

## 2020-02-07 NOTE — H&P ADULT - NSHPLABSRESULTS_GEN_ALL_CORE
16.2   20.60 )-----------( 300      ( 07 Feb 2020 18:47 )               48.3     02-07    137  |  100  |  12  ----------------------------<  125<H>  3.5   |  23  |  1.02    Ca    9.6      07 Feb 2020 18:47  Phos  1.3     02-07  Mg     2.7     02-07    TPro  8.5<H>  /  Alb  4.7  /  TBili  0.9  /  DBili  x   /  AST  25  /  ALT  29  /  AlkPhos  103  02-07    EXAM:  RAD SHOULDER RT    PROCEDURE DATE:  Feb 7 2020   INTERPRETATION:  Anterior shoulder dislocation. No acute fracture.    EXAM:  XR CHEST PORTABLE URGENT 1V    PROCEDURE DATE:  Feb 7 2020   INTERPRETATION:  The tip of the endotracheal tube is above the jorge. The tip of the enteric tube is not visualized, but is seen coursing below the left hemidiaphragm.    Toxicology Screen, Drugs of Abuse, Serum (02.07.20 @ 18:47)    Acetaminophen Level, Serum: < 15.0: ACETAMINOPHEN VALUES ARE RELATED TO TIME OF INGESTION.    TOXIC VALUES  ------------  >  50 ug/mL 12 hour post ingestion  > 100 ug/mL  8 hour post ingestion  > 200 ug/mL  4 hour post ingestion ug/mL    Salicylate Level, Serum: < 5.0: TOXIC VALUES  ---------------  ACUTE INGESTION      > 80 mg/dL  CHRONIC INGESTION    > 40 mg/dL mg/dL    Ethanol, Whole Blood: < 10:   LEVELS OF IMPAIRMENT:  FLUSHING, SLOWING OF REFLEXES  IMPAIRED VISUAL ACUITY:             DEPRESSION OF CNS:                 > 100  FATALITIES REPORTED:               > 400  <10 mg/dL = Negative mg/dL

## 2020-02-07 NOTE — ED ADULT NURSE NOTE - HPI (INCLUDE ILLNESS QUALITY, SEVERITY, DURATION, TIMING, CONTEXT, MODIFYING FACTORS, ASSOCIATED SIGNS AND SYMPTOMS)
Break Coverage: Receive pt in spot  4 alert and oriented x 4 c/o increasing depression, anxiety and took 18/ 150 mg Effexor, and 10 /3mg melatonin.   Denies HI, hallucinations, chest pain, sob, dizziness. Pt c/o R shldr pain; dislocation noted on xray.  Resp unlabored.  Skin intact.  IV placed. Labs sent.  LR in progress.  1:1 maintained for safety.

## 2020-02-07 NOTE — ED PROVIDER NOTE - OBJECTIVE STATEMENT
24 year old M PMH depression (on effexor 150mg) and anxiety presents with suicide attempt today at 1pm. Took 15 150mg effexors and 15 5mg Melatonin today at 1pm with the intention of self harm. Went to sleep. Woke up when mom woke him up. Attempted to self induce vomiting, small amount of bile came up. States he is no longer suicidal. Denies hx of suicidality in the past, but on chart review prior note indicated he had thoughts of suicidality in the past.

## 2020-02-07 NOTE — CONSULT NOTE ADULT - ASSESSMENT
24M PMH depression presented to ED after overdose of venlafaxine and melatonin currently intubated and sedated in process of receiving whole bowel irrigation.      Venlafaxine is a serotonin norepinephrine reuptake inhibitor used to treat primarily depression and anxiety.   Acute overdose commonly presents with nausea, vomiting, tachycardia, hypotension, CNS depression, hyperthermia, rhabdomyolysis and seizures.  Seizures are common with overdoses greater than 1 gram.  Given amount ingested along with shoulder dislocation, I suspect patient did have a seizure from his overdose.  Seizures should be treated with benzodiazepines.  Given its sodium and potassium channel blocking effects, overdoses can lead to QRS and QTc prolongation leading to ventricular tachycardia and death.  QRS widening especially with QRS > 120msec, should be treated with boluses of 50 mEq until QRS narrows.  Unstable ventricular arrhythmias should be treated with ACLS protocols.  Lidocaine is antidysrhythmic of choice for unstable ventricular dysrhythmias.  Its serotonergic properties can lead to serotonin syndrome but patient does not have rigidity, clonus, hyperthermia, or autonomic instability.  For hypotension, IVFs are recommended and vasopressors for refractory hypotension to IVFs.  Monitoring for hyperthermia is ideally done though core temperature rectally or from rawls catheter.  Given the need to sedate to reduce shoulder and desire to undergo whole bowel irrigation, intubation was recommended and whole bowel irrigation started.  Whole bowel irrigation is continued until rectal effluent is clear.  Whole bowel irrigation should be stopped in the event of no bowel sounds or signs of obstruction (distension, constipation).    Melatonin even in large overdoses produces mild symptoms primarily in the form of CNS depression.  Treatment for this overdose if supportive in nature. 24M PMH depression presented to ED after overdose of venlafaxine and melatonin currently intubated and sedated in process of receiving whole bowel irrigation.      Venlafaxine is a serotonin norepinephrine reuptake inhibitor used to treat depression and anxiety.   Acute overdose commonly presents with nausea, vomiting, tachycardia, hypotension, CNS depression, hyperthermia, rhabdomyolysis and seizures.  Seizures are common with overdoses greater than 1 gram.  Given amount ingested along with shoulder dislocation, I suspect patient did have a seizure from his overdose.  Seizures should be treated with benzodiazepines.  Given its sodium and potassium channel blocking effects, overdoses can lead to QRS and QTc prolongation leading to ventricular tachycardia and death.  QRS widening especially with QRS > 120msec, should be treated with boluses of 50 mEq until QRS narrows.  Unstable ventricular arrhythmias should be treated with ACLS protocols.  Lidocaine is antidysrhythmic of choice for unstable ventricular dysrhythmias.  Its serotonergic properties can lead to serotonin syndrome but patient does not have rigidity, clonus, hyperthermia, or autonomic instability.  For hypotension, IVFs are recommended and vasopressors for refractory hypotension to IVFs.  Monitoring for hyperthermia is ideally done though core temperature rectally or from rawls catheter.  Given the need to sedate to reduce shoulder and desire to undergo whole bowel irrigation, intubation was recommended and whole bowel irrigation started.  Whole bowel irrigation is continued until rectal effluent is clear.  Whole bowel irrigation should be stopped in the event of no bowel sounds or signs of obstruction (distension, constipation).    Melatonin even in large overdoses produces mild symptoms primarily in the form of CNS depression.  Treatment for this overdose if supportive in nature.

## 2020-02-07 NOTE — ED PROVIDER NOTE - CLINICAL SUMMARY MEDICAL DECISION MAKING FREE TEXT BOX
24 year old M PMH depression (on effexor 150mg) and anxiety presents with suicide attempt today at 1pm. Took 15 150mg effexors and 15 5mg Melatonin at 1pm. Tachycardic, diaphoretic. Concern for possible seizure given possible shoulder dislocation. Xray with confirmation, labs with hypophosphatemia and leukocytosis which could be seen in setting of seizure. Tox consulted. Given concern for seizure, advising whole bowel irrigation with intubation and shoulder reduction at the same time. MICU aware.

## 2020-02-07 NOTE — ED ADULT NURSE REASSESSMENT NOTE - NS ED NURSE REASSESS COMMENT FT1
Jignesh RN, received pt to TRA for procedure of endotracheal intubation, right shoulder reduction, and golytely administration. Pt A&Ox4 on assessment, appears able to consent to procedure. MD Umaña at bedside, consent signed in chart, all supplies required are available at bedside, VS as documented. Intubated with 7.5 ET tube at 2035. Two peripheral IVs obtained, meds administered as ordered, will continue to monitor.

## 2020-02-08 LAB
ALBUMIN SERPL ELPH-MCNC: 4 G/DL — SIGNIFICANT CHANGE UP (ref 3.3–5)
ALP SERPL-CCNC: 87 U/L — SIGNIFICANT CHANGE UP (ref 40–120)
ALT FLD-CCNC: 25 U/L — SIGNIFICANT CHANGE UP (ref 4–41)
ANION GAP SERPL CALC-SCNC: 11 MMO/L — SIGNIFICANT CHANGE UP (ref 7–14)
ANION GAP SERPL CALC-SCNC: 15 MMO/L — HIGH (ref 7–14)
ANION GAP SERPL CALC-SCNC: 15 MMO/L — HIGH (ref 7–14)
AST SERPL-CCNC: 32 U/L — SIGNIFICANT CHANGE UP (ref 4–40)
BASE EXCESS BLDA CALC-SCNC: -3.8 MMOL/L — SIGNIFICANT CHANGE UP
BASE EXCESS BLDA CALC-SCNC: 0.4 MMOL/L — SIGNIFICANT CHANGE UP
BILIRUB SERPL-MCNC: 0.7 MG/DL — SIGNIFICANT CHANGE UP (ref 0.2–1.2)
BLOOD GAS ARTERIAL - FIO2: 40 — SIGNIFICANT CHANGE UP
BUN SERPL-MCNC: 16 MG/DL — SIGNIFICANT CHANGE UP (ref 7–23)
BUN SERPL-MCNC: 19 MG/DL — SIGNIFICANT CHANGE UP (ref 7–23)
BUN SERPL-MCNC: 19 MG/DL — SIGNIFICANT CHANGE UP (ref 7–23)
CALCIUM SERPL-MCNC: 8.1 MG/DL — LOW (ref 8.4–10.5)
CALCIUM SERPL-MCNC: 8.3 MG/DL — LOW (ref 8.4–10.5)
CALCIUM SERPL-MCNC: 8.8 MG/DL — SIGNIFICANT CHANGE UP (ref 8.4–10.5)
CHLORIDE BLDA-SCNC: 106 MMOL/L — SIGNIFICANT CHANGE UP (ref 96–108)
CHLORIDE SERPL-SCNC: 101 MMOL/L — SIGNIFICANT CHANGE UP (ref 98–107)
CHLORIDE SERPL-SCNC: 102 MMOL/L — SIGNIFICANT CHANGE UP (ref 98–107)
CHLORIDE SERPL-SCNC: 105 MMOL/L — SIGNIFICANT CHANGE UP (ref 98–107)
CK SERPL-CCNC: 1474 U/L — HIGH (ref 30–200)
CO2 SERPL-SCNC: 19 MMOL/L — LOW (ref 22–31)
CO2 SERPL-SCNC: 20 MMOL/L — LOW (ref 22–31)
CO2 SERPL-SCNC: 25 MMOL/L — SIGNIFICANT CHANGE UP (ref 22–31)
CREAT SERPL-MCNC: 1.26 MG/DL — SIGNIFICANT CHANGE UP (ref 0.5–1.3)
CREAT SERPL-MCNC: 1.79 MG/DL — HIGH (ref 0.5–1.3)
CREAT SERPL-MCNC: 1.98 MG/DL — HIGH (ref 0.5–1.3)
GLUCOSE BLDA-MCNC: 114 MG/DL — HIGH (ref 70–99)
GLUCOSE BLDA-MCNC: 81 MG/DL — SIGNIFICANT CHANGE UP (ref 70–99)
GLUCOSE SERPL-MCNC: 112 MG/DL — HIGH (ref 70–99)
GLUCOSE SERPL-MCNC: 75 MG/DL — SIGNIFICANT CHANGE UP (ref 70–99)
GLUCOSE SERPL-MCNC: 81 MG/DL — SIGNIFICANT CHANGE UP (ref 70–99)
HCO3 BLDA-SCNC: 21 MMOL/L — LOW (ref 22–26)
HCO3 BLDA-SCNC: 25 MMOL/L — SIGNIFICANT CHANGE UP (ref 22–26)
HCT VFR BLD CALC: 41.4 % — SIGNIFICANT CHANGE UP (ref 39–50)
HCT VFR BLDA CALC: 40.5 % — SIGNIFICANT CHANGE UP (ref 39–51)
HCT VFR BLDA CALC: 43.5 % — SIGNIFICANT CHANGE UP (ref 39–51)
HGB BLD-MCNC: 13.9 G/DL — SIGNIFICANT CHANGE UP (ref 13–17)
HGB BLDA-MCNC: 13.2 G/DL — SIGNIFICANT CHANGE UP (ref 13–17)
HGB BLDA-MCNC: 14.2 G/DL — SIGNIFICANT CHANGE UP (ref 13–17)
LACTATE BLDA-SCNC: 0.8 MMOL/L — SIGNIFICANT CHANGE UP (ref 0.5–2)
MAGNESIUM SERPL-MCNC: 2.3 MG/DL — SIGNIFICANT CHANGE UP (ref 1.6–2.6)
MAGNESIUM SERPL-MCNC: 2.4 MG/DL — SIGNIFICANT CHANGE UP (ref 1.6–2.6)
MAGNESIUM SERPL-MCNC: 2.5 MG/DL — SIGNIFICANT CHANGE UP (ref 1.6–2.6)
MCHC RBC-ENTMCNC: 28.6 PG — SIGNIFICANT CHANGE UP (ref 27–34)
MCHC RBC-ENTMCNC: 33.6 % — SIGNIFICANT CHANGE UP (ref 32–36)
MCV RBC AUTO: 85.2 FL — SIGNIFICANT CHANGE UP (ref 80–100)
NRBC # FLD: 0 K/UL — SIGNIFICANT CHANGE UP (ref 0–0)
PCO2 BLDA: 42 MMHG — SIGNIFICANT CHANGE UP (ref 35–48)
PCO2 BLDA: 44 MMHG — SIGNIFICANT CHANGE UP (ref 35–48)
PH BLDA: 7.31 PH — LOW (ref 7.35–7.45)
PH BLDA: 7.39 PH — SIGNIFICANT CHANGE UP (ref 7.35–7.45)
PHOSPHATE SERPL-MCNC: 3.4 MG/DL — SIGNIFICANT CHANGE UP (ref 2.5–4.5)
PHOSPHATE SERPL-MCNC: 4.5 MG/DL — SIGNIFICANT CHANGE UP (ref 2.5–4.5)
PHOSPHATE SERPL-MCNC: 5.9 MG/DL — HIGH (ref 2.5–4.5)
PLATELET # BLD AUTO: 284 K/UL — SIGNIFICANT CHANGE UP (ref 150–400)
PMV BLD: 9.5 FL — SIGNIFICANT CHANGE UP (ref 7–13)
PO2 BLDA: 110 MMHG — HIGH (ref 83–108)
PO2 BLDA: 152 MMHG — HIGH (ref 83–108)
POTASSIUM BLDA-SCNC: 3 MMOL/L — LOW (ref 3.4–4.5)
POTASSIUM BLDA-SCNC: 4.1 MMOL/L — SIGNIFICANT CHANGE UP (ref 3.4–4.5)
POTASSIUM SERPL-MCNC: 3.2 MMOL/L — LOW (ref 3.5–5.3)
POTASSIUM SERPL-MCNC: 4 MMOL/L — SIGNIFICANT CHANGE UP (ref 3.5–5.3)
POTASSIUM SERPL-MCNC: 4.1 MMOL/L — SIGNIFICANT CHANGE UP (ref 3.5–5.3)
POTASSIUM SERPL-SCNC: 3.2 MMOL/L — LOW (ref 3.5–5.3)
POTASSIUM SERPL-SCNC: 4 MMOL/L — SIGNIFICANT CHANGE UP (ref 3.5–5.3)
POTASSIUM SERPL-SCNC: 4.1 MMOL/L — SIGNIFICANT CHANGE UP (ref 3.5–5.3)
PROCALCITONIN SERPL-MCNC: 0.06 NG/ML — SIGNIFICANT CHANGE UP (ref 0.02–0.1)
PROT SERPL-MCNC: 7.2 G/DL — SIGNIFICANT CHANGE UP (ref 6–8.3)
RBC # BLD: 4.86 M/UL — SIGNIFICANT CHANGE UP (ref 4.2–5.8)
RBC # FLD: 13.2 % — SIGNIFICANT CHANGE UP (ref 10.3–14.5)
SAO2 % BLDA: 98.4 % — SIGNIFICANT CHANGE UP (ref 95–99)
SAO2 % BLDA: 98.8 % — SIGNIFICANT CHANGE UP (ref 95–99)
SODIUM BLDA-SCNC: 137 MMOL/L — SIGNIFICANT CHANGE UP (ref 136–146)
SODIUM BLDA-SCNC: 137 MMOL/L — SIGNIFICANT CHANGE UP (ref 136–146)
SODIUM SERPL-SCNC: 136 MMOL/L — SIGNIFICANT CHANGE UP (ref 135–145)
SODIUM SERPL-SCNC: 136 MMOL/L — SIGNIFICANT CHANGE UP (ref 135–145)
SODIUM SERPL-SCNC: 141 MMOL/L — SIGNIFICANT CHANGE UP (ref 135–145)
WBC # BLD: 14.44 K/UL — HIGH (ref 3.8–10.5)
WBC # FLD AUTO: 14.44 K/UL — HIGH (ref 3.8–10.5)

## 2020-02-08 PROCEDURE — 99291 CRITICAL CARE FIRST HOUR: CPT

## 2020-02-08 PROCEDURE — 99233 SBSQ HOSP IP/OBS HIGH 50: CPT

## 2020-02-08 RX ORDER — PROPOFOL 10 MG/ML
50 INJECTION, EMULSION INTRAVENOUS
Qty: 1000 | Refills: 0 | Status: DISCONTINUED | OUTPATIENT
Start: 2020-02-07 | End: 2020-02-09

## 2020-02-08 RX ORDER — SODIUM CHLORIDE 9 MG/ML
2000 INJECTION, SOLUTION INTRAVENOUS
Refills: 0 | Status: DISCONTINUED | OUTPATIENT
Start: 2020-02-08 | End: 2020-02-08

## 2020-02-08 RX ORDER — POTASSIUM CHLORIDE 20 MEQ
10 PACKET (EA) ORAL
Refills: 0 | Status: COMPLETED | OUTPATIENT
Start: 2020-02-08 | End: 2020-02-08

## 2020-02-08 RX ORDER — POTASSIUM CHLORIDE 20 MEQ
40 PACKET (EA) ORAL ONCE
Refills: 0 | Status: DISCONTINUED | OUTPATIENT
Start: 2020-02-08 | End: 2020-02-08

## 2020-02-08 RX ORDER — SOD SULF/SODIUM/NAHCO3/KCL/PEG
2000 SOLUTION, RECONSTITUTED, ORAL ORAL ONCE
Refills: 0 | Status: COMPLETED | OUTPATIENT
Start: 2020-02-08 | End: 2020-02-08

## 2020-02-08 RX ORDER — POTASSIUM CHLORIDE 20 MEQ
40 PACKET (EA) ORAL ONCE
Refills: 0 | Status: COMPLETED | OUTPATIENT
Start: 2020-02-08 | End: 2020-02-08

## 2020-02-08 RX ORDER — SODIUM CHLORIDE 9 MG/ML
2000 INJECTION, SOLUTION INTRAVENOUS
Refills: 0 | Status: DISCONTINUED | OUTPATIENT
Start: 2020-02-08 | End: 2020-02-09

## 2020-02-08 RX ADMIN — HEPARIN SODIUM 5000 UNIT(S): 5000 INJECTION INTRAVENOUS; SUBCUTANEOUS at 06:04

## 2020-02-08 RX ADMIN — Medication 63.75 MILLIMOLE(S): at 00:08

## 2020-02-08 RX ADMIN — CHLORHEXIDINE GLUCONATE 15 MILLILITER(S): 213 SOLUTION TOPICAL at 17:05

## 2020-02-08 RX ADMIN — PROPOFOL 44.4 MICROGRAM(S)/KG/MIN: 10 INJECTION, EMULSION INTRAVENOUS at 12:38

## 2020-02-08 RX ADMIN — Medication 2000 MILLILITER(S): at 12:37

## 2020-02-08 RX ADMIN — CHLORHEXIDINE GLUCONATE 15 MILLILITER(S): 213 SOLUTION TOPICAL at 06:04

## 2020-02-08 RX ADMIN — Medication 100 MILLIEQUIVALENT(S): at 17:56

## 2020-02-08 RX ADMIN — Medication 2000 MILLILITER(S): at 20:16

## 2020-02-08 RX ADMIN — Medication 40 MILLIEQUIVALENT(S): at 16:09

## 2020-02-08 RX ADMIN — Medication 100 MILLIEQUIVALENT(S): at 17:01

## 2020-02-08 RX ADMIN — Medication 2000 MILLILITER(S): at 17:01

## 2020-02-08 RX ADMIN — FENTANYL CITRATE 7.6 MICROGRAM(S)/KG/HR: 50 INJECTION INTRAVENOUS at 12:38

## 2020-02-08 RX ADMIN — HEPARIN SODIUM 7500 UNIT(S): 5000 INJECTION INTRAVENOUS; SUBCUTANEOUS at 13:42

## 2020-02-08 RX ADMIN — SODIUM CHLORIDE 250 MILLILITER(S): 9 INJECTION, SOLUTION INTRAVENOUS at 17:57

## 2020-02-08 RX ADMIN — SODIUM CHLORIDE 250 MILLILITER(S): 9 INJECTION, SOLUTION INTRAVENOUS at 12:39

## 2020-02-08 RX ADMIN — CHLORHEXIDINE GLUCONATE 1 APPLICATION(S): 213 SOLUTION TOPICAL at 12:40

## 2020-02-08 RX ADMIN — MIDAZOLAM HYDROCHLORIDE 3.04 MG/KG/HR: 1 INJECTION, SOLUTION INTRAMUSCULAR; INTRAVENOUS at 00:08

## 2020-02-08 RX ADMIN — FENTANYL CITRATE 7.6 MICROGRAM(S)/KG/HR: 50 INJECTION INTRAVENOUS at 00:06

## 2020-02-08 RX ADMIN — SODIUM CHLORIDE 250 MILLILITER(S): 9 INJECTION, SOLUTION INTRAVENOUS at 10:26

## 2020-02-08 RX ADMIN — HEPARIN SODIUM 7500 UNIT(S): 5000 INJECTION INTRAVENOUS; SUBCUTANEOUS at 22:13

## 2020-02-08 RX ADMIN — Medication 2000 MILLILITER(S): at 10:00

## 2020-02-08 RX ADMIN — Medication 100 MILLIEQUIVALENT(S): at 16:08

## 2020-02-08 RX ADMIN — PROPOFOL 44.4 MICROGRAM(S)/KG/MIN: 10 INJECTION, EMULSION INTRAVENOUS at 00:15

## 2020-02-08 NOTE — PROGRESS NOTE ADULT - SUBJECTIVE AND OBJECTIVE BOX
MEDICAL TOXICOLOGY PROGRESS NOTE    Overnight events: no acute events overnight, sedated on propofol, midazolam, and fentanyl, and undergoing whole bowel irrigation.    MEDICATIONS  (STANDING):  chlorhexidine 0.12% Liquid 15 milliLiter(s) Oral Mucosa every 12 hours  chlorhexidine 4% Liquid 1 Application(s) Topical <User Schedule>  fentaNYL   Infusion. 0.5 MICROgram(s)/kG/Hr (7.598 mL/Hr) IV Continuous <Continuous>  heparin  Injectable 5000 Unit(s) SubCutaneous every 8 hours  lactated ringers. 2000 milliLiter(s) (250 mL/Hr) IV Continuous <Continuous>  polyethylene glycol/electrolyte Solution. 2000 milliLiter(s) Oral once  propofol Infusion 50 MICROgram(s)/kG/Min (44.4 mL/Hr) IV Continuous <Continuous>    MEDICATIONS  (PRN):      Vital Signs Last 24 Hrs  T(C): 36.2 (08 Feb 2020 09:00), Max: 36.7 (08 Feb 2020 00:00)  T(F): 97.1 (08 Feb 2020 09:00), Max: 98 (08 Feb 2020 00:00)  HR: 77 (08 Feb 2020 09:00) (76 - 115)  BP: 114/67 (08 Feb 2020 09:00) (109/67 - 192/135)  BP(mean): 81 (08 Feb 2020 09:00) (73 - 149)  RR: 20 (08 Feb 2020 09:00) (17 - 23)  SpO2: 100% (08 Feb 2020 09:00) (97% - 100%)    Allergies    No Known Allergies    Intolerances      REVIEW OF SYSTEMS:   unable to perform due to intoxication, dementia, or illness      PHYSICAL EXAM  Vital Signs Last 24 Hrs  T(C): 36.2 (08 Feb 2020 09:00), Max: 36.7 (08 Feb 2020 00:00)  T(F): 97.1 (08 Feb 2020 09:00), Max: 98 (08 Feb 2020 00:00)  HR: 77 (08 Feb 2020 09:00) (76 - 115)  BP: 114/67 (08 Feb 2020 09:00) (109/67 - 192/135)  BP(mean): 81 (08 Feb 2020 09:00) (73 - 149)  RR: 20 (08 Feb 2020 09:00) (17 - 23)  SpO2: 100% (08 Feb 2020 09:00) (97% - 100%)    General:  intubated and sedated  Head:  normocephalic & atraumatic  Pupils:  2 mm, symmetric, reactive to light  Ear, nose, throat:  mucosa is moist, +ETT, +OGT  Neck:  supple  Respiratory:  clear to auscultation bilaterally, no rales/ronchi/wheezing  Cardiac:  rate is normal, normal rhythm, no rubs/murmurs/gallops  Abdomen:  Soft, nondistended, nontender, +bowel sounds, no organomegaly  :  deferred  Skin:  warm and dry  Neurologic:  1 beat clonus upon ankle flexion, extremities are supple    SIGNIFICANT LABORATORY STUDIES:                        13.9   14.44 )-----------( 284      ( 08 Feb 2020 02:48 )             41.4       02-08    136  |  102  |  16  ----------------------------<  112<H>  4.1   |  19<L>  |  1.26    Ca    8.8      08 Feb 2020 02:57  Phos  5.9     02-08  Mg     2.5     02-08    TPro  7.2  /  Alb  4.0  /  TBili  0.7  /  DBili  x   /  AST  32  /  ALT  25  /  AlkPhos  87  02-08              Anion Gap: 15<H> 02-08 @ 02:57  CK: -- 02-08 @ 02:57  Troponin:  --  02-08 @ 02:57  Pro-BNP:  --  02-08 @ 02:57  VBG:  --  02-08 @ 02:57  Carboxyhemoglobin %:  --  02-08 @ 02:57  Methemoglobin %:  --  02-08 @ 02:57  Osmolality Serum:  --  02-08 @ 02:57  Aspirin Level: --  02-08 @ 02:57  Acetaminophen Level:  --  02-08 @ 02:57  Ethanol Level:  --  02-08 @ 02:57  Digoxin Level:  --  02-08 @ 02:57  Phenytoin Level:  --  02-08 @ 02:57  Carbamazepine level:  --  02-08 @ 02:57  Lamotrigine level:  --  02-08 @ 02:57  Anion Gap: 14 02-07 @ 18:47  CK: -- 02-07 @ 18:47  Troponin:  --  02-07 @ 18:47  Pro-BNP:  --  02-07 @ 18:47  VBG:  --  02-07 @ 18:47  Carboxyhemoglobin %:  --  02-07 @ 18:47  Methemoglobin %:  --  02-07 @ 18:47  Osmolality Serum:  --  02-07 @ 18:47  Aspirin Level: < 5.0<L>  02-07 @ 18:47  Acetaminophen Level:  < 15.0<L>  02-07 @ 18:47  Ethanol Level:  < 10  02-07 @ 18:47  Digoxin Level:  --  02-07 @ 18:47  Phenytoin Level:  --  02-07 @ 18:47  Carbamazepine level:  --  02-07 @ 18:47  Lamotrigine level:  --  02-07 @ 18:47      ECG 2/8: SR 78bpm, QRS narrow, QTc 438   ECG 2/8: SR 90bpm, QRS narrow, QTc 414

## 2020-02-08 NOTE — PROGRESS NOTE ADULT - ASSESSMENT
24M PMH depression presented to ED after overdose of venlafaxine and melatonin currently intubated and sedated in process of receiving whole bowel irrigation.

## 2020-02-08 NOTE — PATIENT PROFILE ADULT - NSPROPTRIGHTNOTIFYOBTAINDET_GEN_A_NUR
In ED, alert and oriented x4, intubated and sedated on arrival to MICU. Report given from ED nurse. Pt unable to participate in plan of care currently.

## 2020-02-08 NOTE — ED PROCEDURE NOTE - NS ED ATTENDING STATEMENT MOD
I have personally seen and examined this patient.  I have fully participated in the care of this patient. I have reviewed all pertinent clinical information, including history, physical exam, plan and the Resident’s note and agree except as noted.
Attending Only
I have personally seen and examined this patient.  I have fully participated in the care of this patient. I have reviewed all pertinent clinical information, including history, physical exam, plan and the Resident’s note and agree except as noted.

## 2020-02-08 NOTE — ED PROCEDURE NOTE - CPROC ED INDICATIONS1
dislocated R shoulder
airway protection/whole bowel irrigation, shoulder reduction
whole bowel irrigation

## 2020-02-08 NOTE — PROGRESS NOTE ADULT - ASSESSMENT
24M PMHx depression and anxiety presents with suicide attempt via overdose on Venlafaxine and Melatonin, admitted to MICU for respiratory support and monitoring.    #Neuro  - High clinical suspicion of seizure due to Effexor OD given leukocytosis + anterior shoulder dislocation; will add on prolaction  - S/p intubation, sedation on propofol and versed  - Toxicology recs appreciated: monitor for seizures, if present --> benzodiazepines  - serotonin syndrome also a concern given high dose ingestion of SSRI, will monitor for hyperthermia.  - Formal psych consult in AM  - Zyprexa 2.5 PO/IM Q6H PRN for agitation    #Respiratory  - Intubated; Vent settings , RR 20, FiO2 40, PEEP 5  - will obtain ABG and adjust as needed.    #Cardiovascular  - Hypertensive and tachycardic on presentation  - Currently hemodynamically stable  - Continuous monitoring and serial EKGs given possibility of arrythmia; per Toxicology, if QRS > 120 --> 50 meQ NaHCO3  - Because effexor is a Na and K channel blocker, high concern for ventricular arrhythmia; per tox will give lidocaine if needed  - Continuous core temperature monitoring given possibility of serotonin syndrome    #Gastrointestinal  - S/p gastric lavage 2/2 ingestion of Effexor and melatonin  - Per Toxicology, c/w whole bowel irrigation/PEG (1-2L/hour) until rectal effluent is clear  - NPO except meds    #/Renal  - No acute issues  - Cr 1.02  - Monitor urinary output    #Heme  - Leukocytosis to 20 likely 2/2 to seizure  - Trend CBC    #Endocrine  - No acute issues  - TSH 1.84    #ID  - No acute issues; leukocytosis likely reactive 2/2 seizure  - Monitor vitals    #MSK  - R anterior shoulder reduction likely 2/2 seizure, reduced in ED. Per ortho, maintain R shoulder in sling  - F/u CT RUE  - Per Toxicology, assess for rigidity given risk of serotonin syndrome, treat with benzos if present    #Prophylactic Measures  - DVT prophylaxis: SQH  - Diet: NPO  - Dispo: Pending clinical status    #Discharge Planning  Transition of Care Status:  1. Name of PCP: None on file  2. PCP contacted on admission: [  ] Y     [  ] N  3. PCP contacted at discharge: [  ] Y     [  ] N  4. Post-discharge appointment date and location:  5. Summary of handoff given to PCP: 24M PMHx depression and anxiety presents with suicide attempt via overdose on Venlafaxine and Melatonin, admitted to MICU for respiratory support and monitoring.    #Neuro  - High clinical suspicion of seizure due to Effexor OD given leukocytosis + anterior shoulder dislocation  - prolactin level was 0.06  - S/p intubation, sedation on propofol and versed w/ fent gtt--will d/c versed this AM  - Toxicology recs appreciated: monitor for seizures, if present --> benzodiazepines  - Low concern for serotonin syndrome   - Psych consulted, f/u recs     #Respiratory  - Intubated; Vent settings , RR 20, FiO2 30, PEEP 5  - f/u ABG after FiO2 adjusted on rounds    #Cardiovascular  - Hypertensive and tachycardic on presentation  - Currently hemodynamically stable  - Continuous monitoring and q2h EKGs given possibility of arrythmia; per Toxicology, if QRS > 120 --> 50 meQ NaHCO3  - Because effexor is a Na and K channel blocker, high concern for ventricular arrhythmia; per tox will give lidocaine if needed    #Gastrointestinal  - S/p gastric lavage 2/2 ingestion of Effexor and melatonin  - Per Toxicology, c/w whole bowel irrigation/PEG (1-2L/hour) until rectal effluent is clear  - NPO except meds    #/Renal  - new HAL w/ Cr 1.26  - possibly 2/2 osmotic diarrhea  -IVF for 8 hrs (2L LR)  -q8h BMP w/ Mg Phos starting at noon  - Monitor urinary output    #Heme  - Leukocytosis to 20 likely reactive  - Daily CBC    #Endocrine  - No acute issues  - TSH 1.84    #ID  - No acute issues; leukocytosis likely reactive 2/2 seizure  - Monitor vitals    #MSK  - R anterior shoulder reduction likely 2/2 seizure, reduced in ED. Per ortho, maintain R shoulder in sling  - F/u CT RUE    #Prophylactic Measures  - DVT prophylaxis: SQH  - Diet: NPO  - Dispo: Pending clinical status

## 2020-02-08 NOTE — CHART NOTE - NSCHARTNOTEFT_GEN_A_CORE
Spoke with MICU team regarding this patient 25yo male admitted s/p overdose on Effexor and melatonin, patient remains intubated and sedated, unable to be evaluated by psychiatry. Psych will continue to follow and patient will be seen once extubated and able to engage in evaluation.

## 2020-02-08 NOTE — PATIENT PROFILE ADULT - OVER THE PAST TWO WEEKS HAVE YOU FELT DOWN, DEPRESSED OR HOPELESS?
CERTIFICATE OF SCHOOL    September 30, 2019      Re:   Simona De León  [id number] [place] [id number]                        This is to certify that Simona De León has been under my care from 9/30/2019 and can return to school tomorrow    RESTRICTIONS: None      REMARKS: Patient unable to attend school today due to illness        SIGNATURE:___________________________________________,   9/30/2019      Caleb Lynne MD        [address] [place] [address]  [phone]
yes

## 2020-02-08 NOTE — PHARMACOTHERAPY INTERVENTION NOTE - COMMENTS
Spoke to md  pointed out that pt. is over 120kgs.  asked if wanted to increase sq. heparin to 7500units  she agreed and upped dose

## 2020-02-08 NOTE — PROGRESS NOTE ADULT - SUBJECTIVE AND OBJECTIVE BOX
INTERVAL HPI/OVERNIGHT EVENTS:    SUBJECTIVE: Patient seen and examined at bedside.     OBJECTIVE:    VITAL SIGNS:  ICU Vital Signs Last 24 Hrs  T(C): 36.5 (08 Feb 2020 04:00), Max: 36.7 (08 Feb 2020 00:00)  T(F): 97.7 (08 Feb 2020 04:00), Max: 98 (08 Feb 2020 00:00)  HR: 77 (08 Feb 2020 06:59) (77 - 115)  BP: 124/76 (08 Feb 2020 06:00) (111/91 - 192/135)  BP(mean): 89 (08 Feb 2020 06:00) (73 - 149)  ABP: --  ABP(mean): --  RR: 23 (08 Feb 2020 06:00) (17 - 23)  SpO2: 99% (08 Feb 2020 06:59) (97% - 100%)    Mode: AC/ CMV (Assist Control/ Continuous Mandatory Ventilation), RR (machine): 20, TV (machine): 400, FiO2: 40, PEEP: 5, MAP: 10, PIP: 23    02-07 @ 07:01  -  02-08 @ 07:00  --------------------------------------------------------  IN: 5071.4 mL / OUT: 1100 mL / NET: 3971.4 mL      CAPILLARY BLOOD GLUCOSE          PHYSICAL EXAM:    General: NAD  HEENT: NC/AT; PERRL, clear conjunctiva  Neck: supple  Respiratory: CTA b/l  Cardiovascular: +S1/S2; RRR  Abdomen: soft, NT/ND; +BS x4  Extremities: WWP, 2+ peripheral pulses b/l; no LE edema  Skin: normal color and turgor; no rash  Neurological:    MEDICATIONS:  MEDICATIONS  (STANDING):  chlorhexidine 0.12% Liquid 15 milliLiter(s) Oral Mucosa every 12 hours  chlorhexidine 4% Liquid 1 Application(s) Topical <User Schedule>  fentaNYL   Infusion. 0.5 MICROgram(s)/kG/Hr (7.598 mL/Hr) IV Continuous <Continuous>  heparin  Injectable 5000 Unit(s) SubCutaneous every 8 hours  midazolam Infusion 0.02 mG/kG/Hr (3.039 mL/Hr) IV Continuous <Continuous>  propofol Infusion 50 MICROgram(s)/kG/Min (44.4 mL/Hr) IV Continuous <Continuous>    MEDICATIONS  (PRN):      ALLERGIES:  Allergies    No Known Allergies    Intolerances        LABS:                        13.9   14.44 )-----------( 284      ( 08 Feb 2020 02:48 )             41.4     Hemoglobin: 13.9 g/dL (02-08 @ 02:48)  Hemoglobin: 16.2 g/dL (02-07 @ 18:47)    CBC Full  -  ( 08 Feb 2020 02:48 )  WBC Count : 14.44 K/uL  RBC Count : 4.86 M/uL  Hemoglobin : 13.9 g/dL  Hematocrit : 41.4 %  Platelet Count - Automated : 284 K/uL  Mean Cell Volume : 85.2 fL  Mean Cell Hemoglobin : 28.6 pg  Mean Cell Hemoglobin Concentration : 33.6 %  Auto Neutrophil # : x  Auto Lymphocyte # : x  Auto Monocyte # : x  Auto Eosinophil # : x  Auto Basophil # : x  Auto Neutrophil % : x  Auto Lymphocyte % : x  Auto Monocyte % : x  Auto Eosinophil % : x  Auto Basophil % : x    02-08    136  |  102  |  16  ----------------------------<  112<H>  4.1   |  19<L>  |  1.26    Ca    8.8      08 Feb 2020 02:57  Phos  5.9     02-08  Mg     2.5     02-08    TPro  7.2  /  Alb  4.0  /  TBili  0.7  /  DBili  x   /  AST  32  /  ALT  25  /  AlkPhos  87  02-08    Creatinine Trend: 1.26<--, 1.02<--, 0.86<--  LIVER FUNCTIONS - ( 08 Feb 2020 02:57 )  Alb: 4.0 g/dL / Pro: 7.2 g/dL / ALK PHOS: 87 u/L / ALT: 25 u/L / AST: 32 u/L / GGT: x               hs Troponin:    ABG - ( 08 Feb 2020 02:50 )  pH, Arterial: 7.31  pH, Blood: x     /  pCO2: 44    /  pO2: 152   / HCO3: 21    / Base Excess: -3.8  /  SaO2: 98.8                02:50 - ABG - pH: 7.31  |  pCO2: 44    |  pO2: 152   | Lactate:       | BE: -3.8         CSF:                      EKG:   MICROBIOLOGY:    IMAGING:      Labs, imaging, EKG personally reviewed    RADIOLOGY & ADDITIONAL TESTS: Reviewed. INTERVAL HPI/OVERNIGHT EVENTS:    SUBJECTIVE: Patient seen and examined at bedside. Patient intubated and sedated, no new events since arrival to MICU.     OBJECTIVE:    VITAL SIGNS:  ICU Vital Signs Last 24 Hrs  T(C): 36.5 (08 Feb 2020 04:00), Max: 36.7 (08 Feb 2020 00:00)  T(F): 97.7 (08 Feb 2020 04:00), Max: 98 (08 Feb 2020 00:00)  HR: 77 (08 Feb 2020 06:59) (77 - 115)  BP: 124/76 (08 Feb 2020 06:00) (111/91 - 192/135)  BP(mean): 89 (08 Feb 2020 06:00) (73 - 149)  ABP: --  ABP(mean): --  RR: 23 (08 Feb 2020 06:00) (17 - 23)  SpO2: 99% (08 Feb 2020 06:59) (97% - 100%)    Mode: AC/ CMV (Assist Control/ Continuous Mandatory Ventilation), RR (machine): 20, TV (machine): 400, FiO2: 40, PEEP: 5, MAP: 10, PIP: 23    02-07 @ 07:01  -  02-08 @ 07:00  --------------------------------------------------------  IN: 5071.4 mL / OUT: 1100 mL / NET: 3971.4 mL      CAPILLARY BLOOD GLUCOSE          PHYSICAL EXAM:    General: sedated  HEENT: NC/AT; clear conjunctiva  Neck: supple  Respiratory: intubated, vent sounds present  Cardiovascular: +S1/S2; RRR  Abdomen: soft, NT/ND; +BS x4  Extremities: WWP, 2+ peripheral pulses b/l; no LE edema  Skin: normal color and turgor; no rash  Neurological: sedated    MEDICATIONS:  MEDICATIONS  (STANDING):  chlorhexidine 0.12% Liquid 15 milliLiter(s) Oral Mucosa every 12 hours  chlorhexidine 4% Liquid 1 Application(s) Topical <User Schedule>  fentaNYL   Infusion. 0.5 MICROgram(s)/kG/Hr (7.598 mL/Hr) IV Continuous <Continuous>  heparin  Injectable 5000 Unit(s) SubCutaneous every 8 hours  midazolam Infusion 0.02 mG/kG/Hr (3.039 mL/Hr) IV Continuous <Continuous>  propofol Infusion 50 MICROgram(s)/kG/Min (44.4 mL/Hr) IV Continuous <Continuous>    MEDICATIONS  (PRN):      ALLERGIES:  Allergies    No Known Allergies    Intolerances        LABS:                        13.9   14.44 )-----------( 284      ( 08 Feb 2020 02:48 )             41.4     Hemoglobin: 13.9 g/dL (02-08 @ 02:48)  Hemoglobin: 16.2 g/dL (02-07 @ 18:47)    CBC Full  -  ( 08 Feb 2020 02:48 )  WBC Count : 14.44 K/uL  RBC Count : 4.86 M/uL  Hemoglobin : 13.9 g/dL  Hematocrit : 41.4 %  Platelet Count - Automated : 284 K/uL  Mean Cell Volume : 85.2 fL  Mean Cell Hemoglobin : 28.6 pg  Mean Cell Hemoglobin Concentration : 33.6 %  Auto Neutrophil # : x  Auto Lymphocyte # : x  Auto Monocyte # : x  Auto Eosinophil # : x  Auto Basophil # : x  Auto Neutrophil % : x  Auto Lymphocyte % : x  Auto Monocyte % : x  Auto Eosinophil % : x  Auto Basophil % : x    02-08    136  |  102  |  16  ----------------------------<  112<H>  4.1   |  19<L>  |  1.26    Ca    8.8      08 Feb 2020 02:57  Phos  5.9     02-08  Mg     2.5     02-08    TPro  7.2  /  Alb  4.0  /  TBili  0.7  /  DBili  x   /  AST  32  /  ALT  25  /  AlkPhos  87  02-08    Creatinine Trend: 1.26<--, 1.02<--, 0.86<--  LIVER FUNCTIONS - ( 08 Feb 2020 02:57 )  Alb: 4.0 g/dL / Pro: 7.2 g/dL / ALK PHOS: 87 u/L / ALT: 25 u/L / AST: 32 u/L / GGT: x               hs Troponin:    ABG - ( 08 Feb 2020 02:50 )  pH, Arterial: 7.31  pH, Blood: x     /  pCO2: 44    /  pO2: 152   / HCO3: 21    / Base Excess: -3.8  /  SaO2: 98.8                02:50 - ABG - pH: 7.31  |  pCO2: 44    |  pO2: 152   | Lactate:       | BE: -3.8         CSF:                      EKG: Monitor QTc w/ q2h EKGs, current QRS approx 80  MICROBIOLOGY:    IMAGING:      Labs, imaging, EKG personally reviewed    RADIOLOGY & ADDITIONAL TESTS: Reviewed.

## 2020-02-08 NOTE — CHART NOTE - NSCHARTNOTEFT_GEN_A_CORE
Contacted ortho resident for regarding Rt anterior shoulder dislocation s/p reduction in ED- on  post reduction film, minimally displaced Rt glenoid fx noted. As per verbal ortho recommendations, RUE should be kept immobilized in sling , NWB, and  further imaging obtained with CT Rt UE. Pt to follow up with Dr. Hein 1-2 weeks after discharge.

## 2020-02-08 NOTE — PATIENT PROFILE ADULT - NSPROSPIRITUALVALUESFT_GEN_A_NUR
Unable to assess. In ED, alert and oriented x4, intubated and sedated on arrival to MICU. Report given from ED nurse. Pt unable to participate in plan of care currently.

## 2020-02-08 NOTE — PATIENT PROFILE ADULT - NSPROGENSOURCEINFO_GEN_A_NUR
In ED, alert and oriented x4, intubated and sedated on arrival to MICU. Report given from ED nurse./patient

## 2020-02-08 NOTE — ED PROCEDURE NOTE - PROCEDURE ADDITIONAL DETAILS
Reduction done under anesthesia initiated for intubation after decision made to perform whole bowel irrigation.  1 attempt at reduction performed via external rotation of shoulder and then flexion of shoulder, clunk felt, successful, confirmed at bedside by smooth internal/external rotation, lack of previously felt fullness at pectoralis area, and lack of stepoff at lateral shoulder and return of normal rounded deltoid; xray performed post proc and confirmed this also.  Pt luca proc well, no comp.  Pt put in sling.   -DDexeus

## 2020-02-09 DIAGNOSIS — F33.2 MAJOR DEPRESSIVE DISORDER, RECURRENT SEVERE WITHOUT PSYCHOTIC FEATURES: ICD-10-CM

## 2020-02-09 LAB
ANION GAP SERPL CALC-SCNC: 12 MMO/L — SIGNIFICANT CHANGE UP (ref 7–14)
ANION GAP SERPL CALC-SCNC: 13 MMO/L — SIGNIFICANT CHANGE UP (ref 7–14)
BASOPHILS # BLD AUTO: 0.04 K/UL — SIGNIFICANT CHANGE UP (ref 0–0.2)
BASOPHILS NFR BLD AUTO: 0.4 % — SIGNIFICANT CHANGE UP (ref 0–2)
BUN SERPL-MCNC: 17 MG/DL — SIGNIFICANT CHANGE UP (ref 7–23)
BUN SERPL-MCNC: 18 MG/DL — SIGNIFICANT CHANGE UP (ref 7–23)
CALCIUM SERPL-MCNC: 8.4 MG/DL — SIGNIFICANT CHANGE UP (ref 8.4–10.5)
CALCIUM SERPL-MCNC: 9 MG/DL — SIGNIFICANT CHANGE UP (ref 8.4–10.5)
CHLORIDE SERPL-SCNC: 102 MMOL/L — SIGNIFICANT CHANGE UP (ref 98–107)
CHLORIDE SERPL-SCNC: 104 MMOL/L — SIGNIFICANT CHANGE UP (ref 98–107)
CO2 SERPL-SCNC: 23 MMOL/L — SIGNIFICANT CHANGE UP (ref 22–31)
CO2 SERPL-SCNC: 25 MMOL/L — SIGNIFICANT CHANGE UP (ref 22–31)
CREAT SERPL-MCNC: 1.56 MG/DL — HIGH (ref 0.5–1.3)
CREAT SERPL-MCNC: 1.71 MG/DL — HIGH (ref 0.5–1.3)
EOSINOPHIL # BLD AUTO: 0.21 K/UL — SIGNIFICANT CHANGE UP (ref 0–0.5)
EOSINOPHIL NFR BLD AUTO: 2.1 % — SIGNIFICANT CHANGE UP (ref 0–6)
GLUCOSE SERPL-MCNC: 78 MG/DL — SIGNIFICANT CHANGE UP (ref 70–99)
GLUCOSE SERPL-MCNC: 95 MG/DL — SIGNIFICANT CHANGE UP (ref 70–99)
HCT VFR BLD CALC: 38.5 % — LOW (ref 39–50)
HGB BLD-MCNC: 12.9 G/DL — LOW (ref 13–17)
IMM GRANULOCYTES NFR BLD AUTO: 0.5 % — SIGNIFICANT CHANGE UP (ref 0–1.5)
LYMPHOCYTES # BLD AUTO: 1.04 K/UL — SIGNIFICANT CHANGE UP (ref 1–3.3)
LYMPHOCYTES # BLD AUTO: 10.4 % — LOW (ref 13–44)
MAGNESIUM SERPL-MCNC: 1.9 MG/DL — SIGNIFICANT CHANGE UP (ref 1.6–2.6)
MAGNESIUM SERPL-MCNC: 2.1 MG/DL — SIGNIFICANT CHANGE UP (ref 1.6–2.6)
MCHC RBC-ENTMCNC: 29 PG — SIGNIFICANT CHANGE UP (ref 27–34)
MCHC RBC-ENTMCNC: 33.5 % — SIGNIFICANT CHANGE UP (ref 32–36)
MCV RBC AUTO: 86.5 FL — SIGNIFICANT CHANGE UP (ref 80–100)
MONOCYTES # BLD AUTO: 1.6 K/UL — HIGH (ref 0–0.9)
MONOCYTES NFR BLD AUTO: 16.1 % — HIGH (ref 2–14)
NEUTROPHILS # BLD AUTO: 7.02 K/UL — SIGNIFICANT CHANGE UP (ref 1.8–7.4)
NEUTROPHILS NFR BLD AUTO: 70.5 % — SIGNIFICANT CHANGE UP (ref 43–77)
NRBC # FLD: 0 K/UL — SIGNIFICANT CHANGE UP (ref 0–0)
PHOSPHATE SERPL-MCNC: 3.3 MG/DL — SIGNIFICANT CHANGE UP (ref 2.5–4.5)
PHOSPHATE SERPL-MCNC: 3.7 MG/DL — SIGNIFICANT CHANGE UP (ref 2.5–4.5)
PLATELET # BLD AUTO: 187 K/UL — SIGNIFICANT CHANGE UP (ref 150–400)
PMV BLD: 9.6 FL — SIGNIFICANT CHANGE UP (ref 7–13)
POTASSIUM SERPL-MCNC: 3.5 MMOL/L — SIGNIFICANT CHANGE UP (ref 3.5–5.3)
POTASSIUM SERPL-MCNC: 3.8 MMOL/L — SIGNIFICANT CHANGE UP (ref 3.5–5.3)
POTASSIUM SERPL-SCNC: 3.5 MMOL/L — SIGNIFICANT CHANGE UP (ref 3.5–5.3)
POTASSIUM SERPL-SCNC: 3.8 MMOL/L — SIGNIFICANT CHANGE UP (ref 3.5–5.3)
PROLACTIN SERPL-MCNC: 12.1 NG/ML — SIGNIFICANT CHANGE UP (ref 4.1–18.4)
RBC # BLD: 4.45 M/UL — SIGNIFICANT CHANGE UP (ref 4.2–5.8)
RBC # FLD: 13.6 % — SIGNIFICANT CHANGE UP (ref 10.3–14.5)
SODIUM SERPL-SCNC: 139 MMOL/L — SIGNIFICANT CHANGE UP (ref 135–145)
SODIUM SERPL-SCNC: 140 MMOL/L — SIGNIFICANT CHANGE UP (ref 135–145)
WBC # BLD: 9.96 K/UL — SIGNIFICANT CHANGE UP (ref 3.8–10.5)
WBC # FLD AUTO: 9.96 K/UL — SIGNIFICANT CHANGE UP (ref 3.8–10.5)

## 2020-02-09 PROCEDURE — 99291 CRITICAL CARE FIRST HOUR: CPT

## 2020-02-09 RX ORDER — SODIUM CHLORIDE 9 MG/ML
2000 INJECTION, SOLUTION INTRAVENOUS
Refills: 0 | Status: DISCONTINUED | OUTPATIENT
Start: 2020-02-09 | End: 2020-02-09

## 2020-02-09 RX ORDER — POTASSIUM CHLORIDE 20 MEQ
40 PACKET (EA) ORAL ONCE
Refills: 0 | Status: COMPLETED | OUTPATIENT
Start: 2020-02-09 | End: 2020-02-09

## 2020-02-09 RX ORDER — SODIUM CHLORIDE 9 MG/ML
1000 INJECTION, SOLUTION INTRAVENOUS
Refills: 0 | Status: DISCONTINUED | OUTPATIENT
Start: 2020-02-09 | End: 2020-02-10

## 2020-02-09 RX ADMIN — Medication 40 MILLIEQUIVALENT(S): at 08:23

## 2020-02-09 RX ADMIN — FENTANYL CITRATE 7.6 MICROGRAM(S)/KG/HR: 50 INJECTION INTRAVENOUS at 08:23

## 2020-02-09 RX ADMIN — HEPARIN SODIUM 7500 UNIT(S): 5000 INJECTION INTRAVENOUS; SUBCUTANEOUS at 21:37

## 2020-02-09 RX ADMIN — HEPARIN SODIUM 7500 UNIT(S): 5000 INJECTION INTRAVENOUS; SUBCUTANEOUS at 06:26

## 2020-02-09 RX ADMIN — HEPARIN SODIUM 7500 UNIT(S): 5000 INJECTION INTRAVENOUS; SUBCUTANEOUS at 14:19

## 2020-02-09 RX ADMIN — CHLORHEXIDINE GLUCONATE 15 MILLILITER(S): 213 SOLUTION TOPICAL at 06:26

## 2020-02-09 RX ADMIN — PROPOFOL 44.4 MICROGRAM(S)/KG/MIN: 10 INJECTION, EMULSION INTRAVENOUS at 08:24

## 2020-02-09 RX ADMIN — Medication 1 TABLET(S): at 21:38

## 2020-02-09 RX ADMIN — SODIUM CHLORIDE 250 MILLILITER(S): 9 INJECTION, SOLUTION INTRAVENOUS at 07:25

## 2020-02-09 RX ADMIN — PROPOFOL 44.4 MICROGRAM(S)/KG/MIN: 10 INJECTION, EMULSION INTRAVENOUS at 08:05

## 2020-02-09 NOTE — BEHAVIORAL HEALTH ASSESSMENT NOTE - NSBHCONSULTFOLLOWDETAILS_PSY_A_CORE FT
This note written by NP, the above impression/plan needs to be reviewed by Dr. Shahid, this note needs to be cosigned transfer to [facility] once medically clear

## 2020-02-09 NOTE — BEHAVIORAL HEALTH ASSESSMENT NOTE - NSBHCHARTREVIEWLAB_PSY_A_CORE FT
CBC Full  -  ( 09 Feb 2020 05:50 )  WBC Count : 9.96 K/uL  RBC Count : 4.45 M/uL  Hemoglobin : 12.9 g/dL  Hematocrit : 38.5 %  Platelet Count - Automated : 187 K/uL  Mean Cell Volume : 86.5 fL  Mean Cell Hemoglobin : 29.0 pg  Mean Cell Hemoglobin Concentration : 33.5 %  Auto Neutrophil # : 7.02 K/uL  Auto Lymphocyte # : 1.04 K/uL  Auto Monocyte # : 1.60 K/uL  Auto Eosinophil # : 0.21 K/uL  Auto Basophil # : 0.04 K/uL  Auto Neutrophil % : 70.5 %  Auto Lymphocyte % : 10.4 %  Auto Monocyte % : 16.1 %  Auto Eosinophil % : 2.1 %  Auto Basophil % : 0.4 %  02-09    139  |  102  |  17  ----------------------------<  95  3.8   |  25  |  1.56<H>    Ca    9.0      09 Feb 2020 15:32  Phos  3.7     02-09  Mg     1.9     02-09    TPro  7.2  /  Alb  4.0  /  TBili  0.7  /  DBili  x   /  AST  32  /  ALT  25  /  AlkPhos  87  02-08

## 2020-02-09 NOTE — BEHAVIORAL HEALTH ASSESSMENT NOTE - NSBHCHARTREVIEWVS_PSY_A_CORE FT
Vital Signs Last 24 Hrs  T(C): 36.4 (09 Feb 2020 16:00), Max: 36.4 (09 Feb 2020 16:00)  T(F): 97.5 (09 Feb 2020 16:00), Max: 97.5 (09 Feb 2020 16:00)  HR: 107 (09 Feb 2020 17:00) (52 - 115)  BP: 142/83 (09 Feb 2020 17:00) (126/80 - 180/93)  BP(mean): 94 (09 Feb 2020 17:00) (83 - 116)  RR: 22 (09 Feb 2020 17:00) (12 - 25)  SpO2: 88% (09 Feb 2020 17:00) (88% - 100%)

## 2020-02-09 NOTE — BEHAVIORAL HEALTH ASSESSMENT NOTE - CASE SUMMARY
Chart reviewed, I read EARLE Juarez's history, MSE, A/P and agree with her with following additions. I personally saw the patient on 2/10/2020. He knows he is here for an overdose but cannot give much of the details leading to overdose. He acknowledges that he remains depressed. Appears somewhat guarded and minimizing situation. He denies active SI now. His affect is dysthymic and blunted. His speech is soft, with a slight prolonged latency. His thought process is linear. Dx MDD. Plan: - will admit 2PC given severity of overdose, concern for impulsivity, concern for minimization of his current mood which led to this admission. - hold venlafaxine for now- will defer to inpatient psych for new medication - ativan prn as per above - continue 1:1 CO for suicidality/impulsivity.

## 2020-02-09 NOTE — PROGRESS NOTE ADULT - SUBJECTIVE AND OBJECTIVE BOX
MEDICAL TOXICOLOGY PROGRESS NOTE    Overnight events: no acute events overnight, WBI discontinued overnight, sedated on propofol and fentanyl.    MEDICATIONS  (STANDING):  chlorhexidine 0.12% Liquid 15 milliLiter(s) Oral Mucosa every 12 hours  chlorhexidine 4% Liquid 1 Application(s) Topical <User Schedule>  fentaNYL   Infusion. 0.5 MICROgram(s)/kG/Hr (7.598 mL/Hr) IV Continuous <Continuous>  heparin  Injectable 7500 Unit(s) SubCutaneous every 8 hours  lactated ringers. 2000 milliLiter(s) (250 mL/Hr) IV Continuous <Continuous>  propofol Infusion 50 MICROgram(s)/kG/Min (44.4 mL/Hr) IV Continuous <Continuous>    MEDICATIONS  (PRN):      Vital Signs Last 24 Hrs  T(C): 35.9 (09 Feb 2020 04:00), Max: 36.4 (08 Feb 2020 08:00)  T(F): 96.6 (09 Feb 2020 04:00), Max: 97.6 (08 Feb 2020 08:00)  HR: 64 (09 Feb 2020 07:12) (52 - 79)  BP: 147/75 (09 Feb 2020 06:00) (110/72 - 167/94)  BP(mean): 92 (09 Feb 2020 06:00) (79 - 109)  RR: 20 (09 Feb 2020 06:00) (20 - 20)  SpO2: 96% (09 Feb 2020 07:12) (96% - 100%)    Allergies    No Known Allergies    Intolerances          REVIEW OF SYSTEMS:   unable to perform due to intoxication, dementia, or illness      PHYSICAL EXAM  Vital Signs Last 24 Hrs  T(C): 35.9 (09 Feb 2020 04:00), Max: 36.4 (08 Feb 2020 08:00)  T(F): 96.6 (09 Feb 2020 04:00), Max: 97.6 (08 Feb 2020 08:00)  HR: 64 (09 Feb 2020 07:12) (52 - 79)  BP: 147/75 (09 Feb 2020 06:00) (110/72 - 167/94)  BP(mean): 92 (09 Feb 2020 06:00) (79 - 109)  RR: 20 (09 Feb 2020 06:00) (20 - 20)  SpO2: 96% (09 Feb 2020 07:12) (96% - 100%)      General:  intubated and sedated  Head:  normocephalic & atraumatic  Pupils:  2 mm, symmetric, reactive to light  Ear, nose, throat:  mucosa is moist, +ETT, +OGT  Neck:  supple  Respiratory:  clear to auscultation bilaterally, no rales/ronchi/wheezing  Cardiac:  rate is normal, normal rhythm, no rubs/murmurs/gallops  Abdomen:  Soft, nondistended, nontender, +bowel sounds, no organomegaly  :  deferred  Skin:  warm and dry  Neurologic:  1 beat clonus upon ankle flexion, extremities are supple      SIGNIFICANT LABORATORY STUDIES:                        12.9   9.96  )-----------( 187      ( 09 Feb 2020 05:50 )             38.5       02-09    140  |  104  |  18  ----------------------------<  78  3.5   |  23  |  1.71<H>    Ca    8.4      09 Feb 2020 05:50  Phos  3.3     02-09  Mg     2.1     02-09    TPro  7.2  /  Alb  4.0  /  TBili  0.7  /  DBili  x   /  AST  32  /  ALT  25  /  AlkPhos  87  02-08              Anion Gap: 13 02-09 @ 05:50  CK: -- 02-09 @ 05:50  Troponin:  --  02-09 @ 05:50  Pro-BNP:  --  02-09 @ 05:50  VBG:  --  02-09 @ 05:50  Carboxyhemoglobin %:  --  02-09 @ 05:50  Methemoglobin %:  --  02-09 @ 05:50  Osmolality Serum:  --  02-09 @ 05:50  Aspirin Level: --  02-09 @ 05:50  Acetaminophen Level:  --  02-09 @ 05:50  Ethanol Level:  --  02-09 @ 05:50  Digoxin Level:  --  02-09 @ 05:50  Phenytoin Level:  --  02-09 @ 05:50  Carbamazepine level:  --  02-09 @ 05:50  Lamotrigine level:  --  02-09 @ 05:50  Anion Gap: 11 02-08 @ 22:05  CK: -- 02-08 @ 22:05  Troponin:  --  02-08 @ 22:05  Pro-BNP:  --  02-08 @ 22:05  VBG:  --  02-08 @ 22:05  Carboxyhemoglobin %:  --  02-08 @ 22:05  Methemoglobin %:  --  02-08 @ 22:05  Osmolality Serum:  --  02-08 @ 22:05  Aspirin Level: --  02-08 @ 22:05  Acetaminophen Level:  --  02-08 @ 22:05  Ethanol Level:  --  02-08 @ 22:05  Digoxin Level:  --  02-08 @ 22:05  Phenytoin Level:  --  02-08 @ 22:05  Carbamazepine level:  --  02-08 @ 22:05  Lamotrigine level:  --  02-08 @ 22:05  Anion Gap: 15<H> 02-08 @ 13:51  CK: 1474<H> 02-08 @ 13:51  Troponin:  --  02-08 @ 13:51  Pro-BNP:  --  02-08 @ 13:51  VBG:  --  02-08 @ 13:51  Carboxyhemoglobin %:  --  02-08 @ 13:51  Methemoglobin %:  --  02-08 @ 13:51  Osmolality Serum:  --  02-08 @ 13:51  Aspirin Level: --  02-08 @ 13:51  Acetaminophen Level:  --  02-08 @ 13:51  Ethanol Level:  --  02-08 @ 13:51  Digoxin Level:  --  02-08 @ 13:51  Phenytoin Level:  --  02-08 @ 13:51  Carbamazepine level:  --  02-08 @ 13:51  Lamotrigine level:  --  02-08 @ 13:51  Anion Gap: 15<H> 02-08 @ 02:57  CK: -- 02-08 @ 02:57  Troponin:  --  02-08 @ 02:57  Pro-BNP:  --  02-08 @ 02:57  VBG:  --  02-08 @ 02:57  Carboxyhemoglobin %:  --  02-08 @ 02:57  Methemoglobin %:  --  02-08 @ 02:57  Osmolality Serum:  --  02-08 @ 02:57  Aspirin Level: --  02-08 @ 02:57  Acetaminophen Level:  --  02-08 @ 02:57  Ethanol Level:  --  02-08 @ 02:57  Digoxin Level:  --  02-08 @ 02:57  Phenytoin Level:  --  02-08 @ 02:57  Carbamazepine level:  --  02-08 @ 02:57  Lamotrigine level:  --  02-08 @ 02:57  Anion Gap: 14 02-07 @ 18:47  CK: -- 02-07 @ 18:47  Troponin:  --  02-07 @ 18:47  Pro-BNP:  --  02-07 @ 18:47  VBG:  --  02-07 @ 18:47  Carboxyhemoglobin %:  --  02-07 @ 18:47  Methemoglobin %:  --  02-07 @ 18:47  Osmolality Serum:  --  02-07 @ 18:47  Aspirin Level: < 5.0<L>  02-07 @ 18:47  Acetaminophen Level:  < 15.0<L>  02-07 @ 18:47  Ethanol Level:  < 10  02-07 @ 18:47  Digoxin Level:  --  02-07 @ 18:47  Phenytoin Level:  --  02-07 @ 18:47  Carbamazepine level:  --  02-07 @ 18:47  Lamotrigine level:  --  02-07 @ 18:47        ECG: SR 55bpm, , QTc 440
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
## 2020-02-09 NOTE — PROGRESS NOTE ADULT - ASSESSMENT
24M PMHx depression and anxiety presents with suicide attempt via overdose on Venlafaxine and Melatonin, admitted to MICU for respiratory support and monitoring.    #Neuro  - High clinical suspicion of seizure due to Effexor OD given leukocytosis + anterior shoulder dislocation  - prolactin level was 0.06  - S/p intubation, sedation on propofol and versed w/ fent gtt--will d/c versed this AM  - Toxicology recs appreciated: monitor for seizures, if present --> benzodiazepines  - Low concern for serotonin syndrome   - Psych consulted, f/u recs     #Respiratory  - Intubated; Vent settings , RR 20, FiO2 30, PEEP 5  - f/u ABG after FiO2 adjusted on rounds    #Cardiovascular  - Hypertensive and tachycardic on presentation  - Currently hemodynamically stable  - Continuous monitoring and q2h EKGs given possibility of arrythmia; per Toxicology, if QRS > 120 --> 50 meQ NaHCO3  - Because effexor is a Na and K channel blocker, high concern for ventricular arrhythmia; per tox will give lidocaine if needed    #Gastrointestinal  - S/p gastric lavage 2/2 ingestion of Effexor and melatonin  - Per Toxicology, c/w whole bowel irrigation/PEG (1-2L/hour) until rectal effluent is clear  - NPO except meds    #/Renal  - new HAL w/ Cr 1.26  - possibly 2/2 osmotic diarrhea  -IVF for 8 hrs (2L LR)  -q8h BMP w/ Mg Phos starting at noon  - Monitor urinary output    #Heme  - Leukocytosis to 20 likely reactive  - Daily CBC    #Endocrine  - No acute issues  - TSH 1.84    #ID  - No acute issues; leukocytosis likely reactive 2/2 seizure  - Monitor vitals    #MSK  - R anterior shoulder reduction likely 2/2 seizure, reduced in ED. Per ortho, maintain R shoulder in sling  - F/u CT RUE    #Prophylactic Measures  - DVT prophylaxis: SQH  - Diet: NPO  - Dispo: Pending clinical status 24M PMHx depression and anxiety presents with suicide attempt via overdose on Venlafaxine and Melatonin, admitted to MICU for respiratory support and monitoring.    #Neuro  - High clinical suspicion of seizure due to Effexor OD given leukocytosis + anterior shoulder dislocation  - prolactin level was 0.06  - regained mental status on 2/9 and extubated  - Toxicology recs appreciated  - Low concern for serotonin syndrome   - Psych consulted, f/u recs   -c/w 1:1  -PRN haldol for agitation    #Respiratory  - Extubated on 2/9    #Cardiovascular  - Hypertensive and tachycardic on presentation  - Currently hemodynamically stable  - Continuous monitoring and daily EKGs to assess QR interval  - daily CK  -can give PRN hydral if needed for HTN    #Gastrointestinal  - S/p gastric lavage 2/2 ingestion of Effexor and melatonin, cleared  - NPO except meds    #/Renal  - new HAL w/ Cr 1.26  - possibly 2/2 osmotic diarrhea  -IVF for 8 hrs (2L LR)  -q8h BMP w/ Mg Phos  - Monitor urinary output  -c/w LR @100    #Heme  - Leukocytosis resolving  - Daily CBC    #Endocrine  - No acute issues  - TSH 1.84  -if FS low, can give amp    #ID  - No acute issues; leukocytosis likely reactive 2/2 seizure  - Monitor vitals    #MSK  - R anterior shoulder reduction likely 2/2 seizure, reduced in ED. Per ortho, maintain R shoulder in sling  - F/u CT RUE  -to f/u w/ ortho 1-2 weeks after discharge    #Prophylactic Measures  - DVT prophylaxis: SQH  - Diet: NPO  - Dispo: Pending clinical status

## 2020-02-09 NOTE — BEHAVIORAL HEALTH ASSESSMENT NOTE - RISK ASSESSMENT
High Acute Suicide Risk Risk Factors: attempted sa, hx si, hx MDD, recent discharge from [facility], poor social relationships, strained family relationships  Protective Factors: residential stability, employment, pets, no access to firearms    Given the circumstance and the impression patient is minimizing recent sa it is in the best interest of the patient to remain on 1:1 Constant Observation

## 2020-02-09 NOTE — BEHAVIORAL HEALTH ASSESSMENT NOTE - NSBHCONSULTPRIMARYDISCUSSYES_PSY_A_CORE FT
discussed with Dr. Reynoso above recommendations  discussed with Dr. Reynoso above recommendations    Zehra discussed with MICU resident

## 2020-02-09 NOTE — PROGRESS NOTE ADULT - ASSESSMENT
24M PMH depression presented to ED after overdose of venlafaxine and melatonin currently intubated and sedated s/p WBI.
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
## 2020-02-09 NOTE — BEHAVIORAL HEALTH ASSESSMENT NOTE - NSBHCONSULTOBSREASON_PSY_A_CORE FT
Given the circumstance and the impression patient is minimizing recent sa it is in the best interest of the patient to remain on 1:1 Constant Observation

## 2020-02-09 NOTE — PROGRESS NOTE ADULT - SUBJECTIVE AND OBJECTIVE BOX
INTERVAL HPI/OVERNIGHT EVENTS:    SUBJECTIVE: Patient seen and examined at bedside.     OBJECTIVE:    VITAL SIGNS:  ICU Vital Signs Last 24 Hrs  T(C): 35.9 (09 Feb 2020 04:00), Max: 36.4 (08 Feb 2020 08:00)  T(F): 96.6 (09 Feb 2020 04:00), Max: 97.6 (08 Feb 2020 08:00)  HR: 64 (09 Feb 2020 07:12) (52 - 79)  BP: 147/75 (09 Feb 2020 06:00) (110/72 - 167/94)  BP(mean): 92 (09 Feb 2020 06:00) (79 - 109)  ABP: --  ABP(mean): --  RR: 20 (09 Feb 2020 06:00) (20 - 20)  SpO2: 96% (09 Feb 2020 07:12) (96% - 100%)    Mode: AC/ CMV (Assist Control/ Continuous Mandatory Ventilation), RR (machine): 20, TV (machine): 400, FiO2: 30, PEEP: 5, MAP: 10, PIP: 24    02-08 @ 07:01  -  02-09 @ 07:00  --------------------------------------------------------  IN: 48945.8 mL / OUT: 7442 mL / NET: 7562.8 mL      CAPILLARY BLOOD GLUCOSE          PHYSICAL EXAM:    General: NAD  HEENT: NC/AT; PERRL, clear conjunctiva  Neck: supple  Respiratory: CTA b/l  Cardiovascular: +S1/S2; RRR  Abdomen: soft, NT/ND; +BS x4  Extremities: WWP, 2+ peripheral pulses b/l; no LE edema  Skin: normal color and turgor; no rash  Neurological:    MEDICATIONS:  MEDICATIONS  (STANDING):  chlorhexidine 0.12% Liquid 15 milliLiter(s) Oral Mucosa every 12 hours  chlorhexidine 4% Liquid 1 Application(s) Topical <User Schedule>  fentaNYL   Infusion. 0.5 MICROgram(s)/kG/Hr (7.598 mL/Hr) IV Continuous <Continuous>  heparin  Injectable 7500 Unit(s) SubCutaneous every 8 hours  lactated ringers. 2000 milliLiter(s) (250 mL/Hr) IV Continuous <Continuous>  propofol Infusion 50 MICROgram(s)/kG/Min (44.4 mL/Hr) IV Continuous <Continuous>    MEDICATIONS  (PRN):      ALLERGIES:  Allergies    No Known Allergies    Intolerances        LABS:                        12.9   9.96  )-----------( 187      ( 09 Feb 2020 05:50 )             38.5     Hemoglobin: 12.9 g/dL (02-09 @ 05:50)  Hemoglobin: 13.9 g/dL (02-08 @ 02:48)  Hemoglobin: 16.2 g/dL (02-07 @ 18:47)    CBC Full  -  ( 09 Feb 2020 05:50 )  WBC Count : 9.96 K/uL  RBC Count : 4.45 M/uL  Hemoglobin : 12.9 g/dL  Hematocrit : 38.5 %  Platelet Count - Automated : 187 K/uL  Mean Cell Volume : 86.5 fL  Mean Cell Hemoglobin : 29.0 pg  Mean Cell Hemoglobin Concentration : 33.5 %  Auto Neutrophil # : 7.02 K/uL  Auto Lymphocyte # : 1.04 K/uL  Auto Monocyte # : 1.60 K/uL  Auto Eosinophil # : 0.21 K/uL  Auto Basophil # : 0.04 K/uL  Auto Neutrophil % : 70.5 %  Auto Lymphocyte % : 10.4 %  Auto Monocyte % : 16.1 %  Auto Eosinophil % : 2.1 %  Auto Basophil % : 0.4 %    02-08    141  |  105  |  19  ----------------------------<  75  4.0   |  25  |  1.98<H>    Ca    8.3<L>      08 Feb 2020 22:05  Phos  3.4     02-08  Mg     2.3     02-08    TPro  7.2  /  Alb  4.0  /  TBili  0.7  /  DBili  x   /  AST  32  /  ALT  25  /  AlkPhos  87  02-08    Creatinine Trend: 1.98<--, 1.79<--, 1.26<--, 1.02<--, 0.86<--  LIVER FUNCTIONS - ( 08 Feb 2020 02:57 )  Alb: 4.0 g/dL / Pro: 7.2 g/dL / ALK PHOS: 87 u/L / ALT: 25 u/L / AST: 32 u/L / GGT: x               hs Troponin:    ABG - ( 08 Feb 2020 13:51 )  pH, Arterial: 7.39  pH, Blood: x     /  pCO2: 42    /  pO2: 110   / HCO3: 25    / Base Excess: 0.4   /  SaO2: 98.4                13:51 - ABG - pH: 7.39  |  pCO2: 42    |  pO2: 110   | Lactate: 0.8   | BE: 0.4          CSF:                      EKG:   MICROBIOLOGY:    IMAGING:      Labs, imaging, EKG personally reviewed    RADIOLOGY & ADDITIONAL TESTS: Reviewed. INTERVAL HPI/OVERNIGHT EVENTS:    SUBJECTIVE: Patient seen and examined at bedside. No acute events overnight, golytely d/c'd since effluent clear w/ ghost pills. Plan to extubate today after rounds w/ psych f/u.     OBJECTIVE:    VITAL SIGNS:  ICU Vital Signs Last 24 Hrs  T(C): 35.9 (09 Feb 2020 04:00), Max: 36.4 (08 Feb 2020 08:00)  T(F): 96.6 (09 Feb 2020 04:00), Max: 97.6 (08 Feb 2020 08:00)  HR: 64 (09 Feb 2020 07:12) (52 - 79)  BP: 147/75 (09 Feb 2020 06:00) (110/72 - 167/94)  BP(mean): 92 (09 Feb 2020 06:00) (79 - 109)  ABP: --  ABP(mean): --  RR: 20 (09 Feb 2020 06:00) (20 - 20)  SpO2: 96% (09 Feb 2020 07:12) (96% - 100%)    Mode: AC/ CMV (Assist Control/ Continuous Mandatory Ventilation), RR (machine): 20, TV (machine): 400, FiO2: 30, PEEP: 5, MAP: 10, PIP: 24    02-08 @ 07:01  -  02-09 @ 07:00  --------------------------------------------------------  IN: 75907.8 mL / OUT: 7442 mL / NET: 7562.8 mL      CAPILLARY BLOOD GLUCOSE          PHYSICAL EXAM:    General: NAD, sedated   HEENT: NC/AT; miotic pupils, clear conjunctiva  Neck: supple  Respiratory: CTA b/l  Cardiovascular: +S1/S2; RRR  Abdomen: soft, NT/ND; +BS x4  Extremities: WWP, 2+ peripheral pulses b/l; no LE edema  Skin: normal color and turgor; no rash  Neurological: sedated    MEDICATIONS:  MEDICATIONS  (STANDING):  chlorhexidine 0.12% Liquid 15 milliLiter(s) Oral Mucosa every 12 hours  chlorhexidine 4% Liquid 1 Application(s) Topical <User Schedule>  fentaNYL   Infusion. 0.5 MICROgram(s)/kG/Hr (7.598 mL/Hr) IV Continuous <Continuous>  heparin  Injectable 7500 Unit(s) SubCutaneous every 8 hours  lactated ringers. 2000 milliLiter(s) (250 mL/Hr) IV Continuous <Continuous>  propofol Infusion 50 MICROgram(s)/kG/Min (44.4 mL/Hr) IV Continuous <Continuous>    MEDICATIONS  (PRN):      ALLERGIES:  Allergies    No Known Allergies    Intolerances        LABS:                        12.9   9.96  )-----------( 187      ( 09 Feb 2020 05:50 )             38.5     Hemoglobin: 12.9 g/dL (02-09 @ 05:50)  Hemoglobin: 13.9 g/dL (02-08 @ 02:48)  Hemoglobin: 16.2 g/dL (02-07 @ 18:47)    CBC Full  -  ( 09 Feb 2020 05:50 )  WBC Count : 9.96 K/uL  RBC Count : 4.45 M/uL  Hemoglobin : 12.9 g/dL  Hematocrit : 38.5 %  Platelet Count - Automated : 187 K/uL  Mean Cell Volume : 86.5 fL  Mean Cell Hemoglobin : 29.0 pg  Mean Cell Hemoglobin Concentration : 33.5 %  Auto Neutrophil # : 7.02 K/uL  Auto Lymphocyte # : 1.04 K/uL  Auto Monocyte # : 1.60 K/uL  Auto Eosinophil # : 0.21 K/uL  Auto Basophil # : 0.04 K/uL  Auto Neutrophil % : 70.5 %  Auto Lymphocyte % : 10.4 %  Auto Monocyte % : 16.1 %  Auto Eosinophil % : 2.1 %  Auto Basophil % : 0.4 %    02-08    141  |  105  |  19  ----------------------------<  75  4.0   |  25  |  1.98<H>    Ca    8.3<L>      08 Feb 2020 22:05  Phos  3.4     02-08  Mg     2.3     02-08    TPro  7.2  /  Alb  4.0  /  TBili  0.7  /  DBili  x   /  AST  32  /  ALT  25  /  AlkPhos  87  02-08    Creatinine Trend: 1.98<--, 1.79<--, 1.26<--, 1.02<--, 0.86<--  LIVER FUNCTIONS - ( 08 Feb 2020 02:57 )  Alb: 4.0 g/dL / Pro: 7.2 g/dL / ALK PHOS: 87 u/L / ALT: 25 u/L / AST: 32 u/L / GGT: x               hs Troponin:    ABG - ( 08 Feb 2020 13:51 )  pH, Arterial: 7.39  pH, Blood: x     /  pCO2: 42    /  pO2: 110   / HCO3: 25    / Base Excess: 0.4   /  SaO2: 98.4                13:51 - ABG - pH: 7.39  |  pCO2: 42    |  pO2: 110   | Lactate: 0.8   | BE: 0.4          CSF:                      EKG:   MICROBIOLOGY:    IMAGING:      Labs, imaging, EKG personally reviewed    RADIOLOGY & ADDITIONAL TESTS: Reviewed.

## 2020-02-10 LAB
ALBUMIN SERPL ELPH-MCNC: 3.1 G/DL — LOW (ref 3.3–5)
ALP SERPL-CCNC: 71 U/L — SIGNIFICANT CHANGE UP (ref 40–120)
ALT FLD-CCNC: 24 U/L — SIGNIFICANT CHANGE UP (ref 4–41)
ANION GAP SERPL CALC-SCNC: 13 MMO/L — SIGNIFICANT CHANGE UP (ref 7–14)
AST SERPL-CCNC: 43 U/L — HIGH (ref 4–40)
BASOPHILS # BLD AUTO: 0.03 K/UL — SIGNIFICANT CHANGE UP (ref 0–0.2)
BASOPHILS NFR BLD AUTO: 0.3 % — SIGNIFICANT CHANGE UP (ref 0–2)
BILIRUB SERPL-MCNC: 0.6 MG/DL — SIGNIFICANT CHANGE UP (ref 0.2–1.2)
BUN SERPL-MCNC: 12 MG/DL — SIGNIFICANT CHANGE UP (ref 7–23)
CALCIUM SERPL-MCNC: 8.6 MG/DL — SIGNIFICANT CHANGE UP (ref 8.4–10.5)
CHLORIDE SERPL-SCNC: 99 MMOL/L — SIGNIFICANT CHANGE UP (ref 98–107)
CK SERPL-CCNC: 1374 U/L — HIGH (ref 30–200)
CO2 SERPL-SCNC: 25 MMOL/L — SIGNIFICANT CHANGE UP (ref 22–31)
CREAT SERPL-MCNC: 1.13 MG/DL — SIGNIFICANT CHANGE UP (ref 0.5–1.3)
EOSINOPHIL # BLD AUTO: 0.14 K/UL — SIGNIFICANT CHANGE UP (ref 0–0.5)
EOSINOPHIL NFR BLD AUTO: 1.5 % — SIGNIFICANT CHANGE UP (ref 0–6)
GLUCOSE SERPL-MCNC: 86 MG/DL — SIGNIFICANT CHANGE UP (ref 70–99)
HCT VFR BLD CALC: 36.8 % — LOW (ref 39–50)
HGB BLD-MCNC: 12.4 G/DL — LOW (ref 13–17)
IMM GRANULOCYTES NFR BLD AUTO: 0.4 % — SIGNIFICANT CHANGE UP (ref 0–1.5)
LYMPHOCYTES # BLD AUTO: 1.27 K/UL — SIGNIFICANT CHANGE UP (ref 1–3.3)
LYMPHOCYTES # BLD AUTO: 13.3 % — SIGNIFICANT CHANGE UP (ref 13–44)
MAGNESIUM SERPL-MCNC: 1.6 MG/DL — SIGNIFICANT CHANGE UP (ref 1.6–2.6)
MCHC RBC-ENTMCNC: 28.8 PG — SIGNIFICANT CHANGE UP (ref 27–34)
MCHC RBC-ENTMCNC: 33.7 % — SIGNIFICANT CHANGE UP (ref 32–36)
MCV RBC AUTO: 85.6 FL — SIGNIFICANT CHANGE UP (ref 80–100)
MONOCYTES # BLD AUTO: 0.99 K/UL — HIGH (ref 0–0.9)
MONOCYTES NFR BLD AUTO: 10.3 % — SIGNIFICANT CHANGE UP (ref 2–14)
NEUTROPHILS # BLD AUTO: 7.1 K/UL — SIGNIFICANT CHANGE UP (ref 1.8–7.4)
NEUTROPHILS NFR BLD AUTO: 74.2 % — SIGNIFICANT CHANGE UP (ref 43–77)
NRBC # FLD: 0 K/UL — SIGNIFICANT CHANGE UP (ref 0–0)
PHOSPHATE SERPL-MCNC: 3.8 MG/DL — SIGNIFICANT CHANGE UP (ref 2.5–4.5)
PLATELET # BLD AUTO: 190 K/UL — SIGNIFICANT CHANGE UP (ref 150–400)
PMV BLD: 9.8 FL — SIGNIFICANT CHANGE UP (ref 7–13)
POTASSIUM SERPL-MCNC: 3.9 MMOL/L — SIGNIFICANT CHANGE UP (ref 3.5–5.3)
POTASSIUM SERPL-SCNC: 3.9 MMOL/L — SIGNIFICANT CHANGE UP (ref 3.5–5.3)
PROT SERPL-MCNC: 5.7 G/DL — LOW (ref 6–8.3)
RBC # BLD: 4.3 M/UL — SIGNIFICANT CHANGE UP (ref 4.2–5.8)
RBC # FLD: 13.1 % — SIGNIFICANT CHANGE UP (ref 10.3–14.5)
SODIUM SERPL-SCNC: 137 MMOL/L — SIGNIFICANT CHANGE UP (ref 135–145)
WBC # BLD: 9.57 K/UL — SIGNIFICANT CHANGE UP (ref 3.8–10.5)
WBC # FLD AUTO: 9.57 K/UL — SIGNIFICANT CHANGE UP (ref 3.8–10.5)

## 2020-02-10 PROCEDURE — 99291 CRITICAL CARE FIRST HOUR: CPT

## 2020-02-10 RX ORDER — SODIUM CHLORIDE 9 MG/ML
1000 INJECTION, SOLUTION INTRAVENOUS
Refills: 0 | Status: DISCONTINUED | OUTPATIENT
Start: 2020-02-10 | End: 2020-02-10

## 2020-02-10 RX ORDER — ENOXAPARIN SODIUM 100 MG/ML
40 INJECTION SUBCUTANEOUS DAILY
Refills: 0 | Status: DISCONTINUED | OUTPATIENT
Start: 2020-02-10 | End: 2020-02-11

## 2020-02-10 RX ADMIN — Medication 1 TABLET(S): at 13:16

## 2020-02-10 RX ADMIN — CHLORHEXIDINE GLUCONATE 1 APPLICATION(S): 213 SOLUTION TOPICAL at 13:16

## 2020-02-10 RX ADMIN — HEPARIN SODIUM 7500 UNIT(S): 5000 INJECTION INTRAVENOUS; SUBCUTANEOUS at 05:12

## 2020-02-10 RX ADMIN — ENOXAPARIN SODIUM 40 MILLIGRAM(S): 100 INJECTION SUBCUTANEOUS at 13:17

## 2020-02-10 NOTE — DIETITIAN INITIAL EVALUATION ADULT. - OTHER INFO
Pt admitted w/dx of poisoning by unspecified drugs.  Pt w/drug overdose, suicide attempt.  Pt was initially intubated/sedated.  Extubated yesterday.  Started on po diet.  Met w/pt and family to obtain nutrition hx.  Pt denies food allergies, difficulty chewing/swallowing, modified diet  and recent wt change.  He states his appetite is fair for the last few days but has been eating well.

## 2020-02-10 NOTE — PROGRESS NOTE ADULT - ASSESSMENT
24M PMHx depression and anxiety presents with suicide attempt via overdose on Venlafaxine and Melatonin, admitted to MICU for respiratory support and monitoring.    #Neuro  - High clinical suspicion of seizure due to Effexor OD given leukocytosis + anterior shoulder dislocation  - prolactin level was 0.06  - regained mental status on 2/9 and extubated  - Toxicology recs appreciated  - Low concern for serotonin syndrome   - Psych consulted, f/u recs   -c/w 1:1  -PRN ativan for agitation    #Respiratory  - Extubated on 2/9    #Cardiovascular  - Hypertensive and tachycardic on presentation  - Currently hemodynamically stable  - Continuous monitoring and daily EKGs to assess QR interval  - daily CK  -can give PRN hydral if needed for HTN    #Gastrointestinal  - S/p gastric lavage 2/2 ingestion of Effexor and melatonin, cleared  - NPO except meds    #/Renal  - new HAL w/ Cr going upwards to around 2, now resolving (1.1)  - possibly 2/2 osmotic diarrhea  - IVF for 8 hrs (2L LR), now on oral fluids  -q8h BMP w/ Mg Phos  - Monitor urinary output  -c/w LR @100    #Heme  - Leukocytosis resolving  - Daily CBC    #Endocrine  - No acute issues  - TSH 1.84  -if FS low, can give amp    #ID  - No acute issues; leukocytosis likely reactive 2/2 seizure  - Monitor vitals    #MSK  - R anterior shoulder reduction likely 2/2 seizure, reduced in ED. Per ortho, maintain R shoulder in sling  - F/u CT RUE  -to f/u w/ ortho 1-2 weeks after discharge    #Prophylactic Measures  - DVT prophylaxis: lovenox  - Diet: NPO  - Dispo: Pending clinical status 24M PMHx depression and anxiety presents with suicide attempt via overdose on Venlafaxine and Melatonin, admitted to MICU for respiratory support and monitoring.    #Neuro  - High clinical suspicion of seizure due to Effexor OD given leukocytosis + anterior shoulder dislocation  - prolactin level was 0.06  - regained mental status on 2/9 and extubated  - Toxicology recs appreciated  - Low concern for serotonin syndrome   - Psych consulted, f/u recs   -c/w 1:1  -PRN ativan for agitation    #Respiratory  - Extubated on 2/9    #Cardiovascular  - Hypertensive and tachycardic on presentation  - Currently hemodynamically stable  - daily CK  - d/c EKG QD monitoring, has been stable  - can give PRN hydral if needed for HTN    #Gastrointestinal  - S/p gastric lavage 2/2 ingestion of Effexor and melatonin, cleared  - NPO except meds    #/Renal  - new HAL w/ Cr going upwards to around 2, now resolving (1.1)  - possibly 2/2 osmotic diarrhea  - IVF for 8 hrs (2L LR), now on oral fluids  - QD BMP w/ Mg Phos  - Monitor urinary output    #Heme  - Leukocytosis resolving  - Daily CBC    #Endocrine  - No acute issues  - TSH 1.84  -if FS low, can give amp    #ID  - No acute issues; leukocytosis likely reactive 2/2 seizure  - Monitor vitals    #MSK  - R anterior shoulder reduction likely 2/2 seizure, reduced in ED. Per ortho, maintain R shoulder in sling  - F/u CT RUE  -to f/u w/ ortho 1-2 weeks after discharge    #Prophylactic Measures  - DVT prophylaxis: lovenox  - Diet: NPO  - Dispo: Pending clinical status

## 2020-02-10 NOTE — CHART NOTE - NSCHARTNOTEFT_GEN_A_CORE
MICU Transfer Note    Transfer from: MICU    Transfer to: (X) Medicine    (  ) Telemetry     (   ) RCU        (    ) Palliative         (   ) Stroke Unit          (   ) __________________    Accepting Physician: Shade (Hospitalist)  Signout given to: Shade (Hospitalist)Avni (MAR)    MICU COURSE:   24 year old M PMH depression and anxiety presented to ER after suicide attempt by ingestion of 15 tabs of 150mg Venlafaxine and 15 tabs of 3mg Melatonin. Pt with dislocation of R shoulder, suspected possible unwitnessed seizure. Patient was intubated in the ER for airway protection. Patinet underwent bowel irrigation to evacuated pills. On XR patient noted to have glenoid humerus ftx s/p shoulder reduction. Patient was monitored with serial EKG; QRS and QTc within normal limits. Course complicated by HAL that improved. Patient evaluated by Psychiatry and deemed him likely appropriate for Inpatient [facility].    ASSESSMENT & PLAN:     #Neuro  - High clinical suspicion of seizure due to Effexor overdose given leukocytosis + anterior shoulder dislocation  - prolactin level was 0.06  - regained mental status on 2/9 and extubated  - Toxicology recs appreciated  - Low concern for serotonin syndrome   - Psych consulted, f/u recs   - C/w 1:1  - PRN ativan for agitation    #Respiratory  - Extubated on 2/9  - Saturating well on RA; continue to monitor    #Cardiovascular  - Hypertensive and tachycardic on presentation  - Currently hemodynamically stable  - Daily CK  - d/c EKG QD monitoring, has been stable  - Can give PRN hydralazine if needed for HTN    #Gastrointestinal  - S/p gastric lavage 2/2 ingestion of Effexor and melatonin, cleared  - NPO except meds    #/Renal  - new HAL w/ Cr going upwards to around 2, now resolving (1.1)  - possibly 2/2 osmotic diarrhea  - IVF for 8 hrs (2L LR), now on oral fluids  - QD BMP w/Mg, Phos  - Monitor urinary output    #Heme  - Leukocytosis resolving; likely 2/2 seizure  - Daily CBC    #Endocrine  - No acute issues  - TSH 1.84  - If FS low, can give amp of dextrose    #ID  - No acute issues; leukocytosis likely reactive 2/2 seizure  - Monitor vitals    #MSK  - R anterior shoulder reduction likely 2/2 seizure, reduced in ED. Per ortho, maintain R shoulder in sling  - F/u CT RUE  - To f/u w/ Ortho 1-2 weeks after discharge    #Prophylactic Measures  - DVT prophylaxis: Lovenox  - Diet: NPO  - Dispo: Pending clinical status        FOR FOLLOW UP:  CT UE to aseess fx.  Pt to follow up with Dr. Hein 1-2 weeks after discharge.  Hold venlafaxine  Pt will need 2PC given severity of OD and concern for impulsivity  Ativan prn  Continue 1:1. MICU Transfer Note    Transfer from: MICU    Transfer to: (X) Medicine    (  ) Telemetry     (   ) RCU        (    ) Palliative         (   ) Stroke Unit          (   ) __________________    Accepting Physician: Shade (Hospitalist)  Signout given to: Shade (Hospitalist)Avni (MAR)    MICU COURSE:   24 year old M PMH depression and anxiety presented to ER after suicide attempt by ingestion of 15 tabs of 150mg Venlafaxine and 15 tabs of 3mg Melatonin. Pt with dislocation of R shoulder, suspected possible unwitnessed seizure. Patient was intubated in the ER for airway protection. Patinet underwent bowel irrigation to evacuated pills. On XR patient noted to have glenoid humerus ftx s/p shoulder reduction. Patient was monitored with serial EKG; QRS and QTc within normal limits. Course complicated by HAL that improved. Patient evaluated by Psychiatry and deemed him likely appropriate for Inpatient [facility].    ASSESSMENT & PLAN:     #Neuro  - High clinical suspicion of seizure due to Effexor overdose given leukocytosis + anterior shoulder dislocation  - prolactin level was 0.06  - regained mental status on 2/9 and extubated  - Toxicology recs appreciated  - Low concern for serotonin syndrome   - Psych consulted, f/u recs   - C/w 1:1  - PRN ativan for agitation    #Respiratory  - Extubated on 2/9  - Saturating well on RA; continue to monitor    #Cardiovascular  - Hypertensive and tachycardic on presentation  - Currently hemodynamically stable  - Daily CK  - d/c EKG QD monitoring, has been stable  - Can give PRN hydralazine if needed for HTN    #Gastrointestinal  - S/p gastric lavage 2/2 ingestion of Effexor and melatonin, cleared  - regular diet     #/Renal  - new HAL w/ Cr going upwards to around 2, now resolving (1.1)  - possibly 2/2 osmotic diarrhea  - IVF for 8 hrs (2L LR), now on oral fluids  - QD BMP w/Mg, Phos  - Monitor urinary output    #Heme  - Leukocytosis resolving; likely 2/2 seizure  - Daily CBC    #Endocrine  - No acute issues  - TSH 1.84  - If FS low, can give amp of dextrose    #ID  - No acute issues; leukocytosis likely reactive 2/2 seizure  - Monitor vitals    #MSK  - R anterior shoulder reduction likely 2/2 seizure, reduced in ED. Per ortho, maintain R shoulder in sling  - F/u CT RUE  - To f/u w/ Ortho 1-2 weeks after discharge    #Prophylactic Measures  - DVT prophylaxis: Lovenox  - Diet: regular diet  - Dispo: Pending clinical status        FOR FOLLOW UP:  CT UE to assess fx.  Pt to follow up with Dr. Hein 1-2 weeks after discharge.  Hold venlafaxine  Pt will need 2PC given severity of OD and concern for impulsivity  Ativan prn  Continue 1:1.

## 2020-02-10 NOTE — CHART NOTE - NSCHARTNOTEFT_GEN_A_CORE
MICU Transfer Note    Transfer from: MICU    Transfer to: (X) Medicine    (  ) Telemetry     (   ) RCU        (    ) Palliative         (   ) Stroke Unit          (   ) __________________    Accepting Physician:  Signout given to:     MICU COURSE:   24 year old M PMH depression and anxiety presented to ER after suicide attempt by ingestion of 15 150mg Venlafaxine and 15 3mg Melatonin. Pt with dislocation of R shoulder. Given this and white count, tox and ER team concerned for seizure activity. Intubated and shoulder reduction and whole bowel irrigation which was successful. Glenoid fx noted on xray. Patient's QRS and QTc within normal limits.           ASSESSMENT & PLAN:             FOR FOLLOW UP: MICU Transfer Note    Transfer from: MICU    Transfer to: (X) Medicine    (  ) Telemetry     (   ) RCU        (    ) Palliative         (   ) Stroke Unit          (   ) __________________    Accepting Physician:  Signout given to:     MICU COURSE:   24 year old M PMH depression and anxiety presented to ER after suicide attempt by ingestion of 15 150mg Venlafaxine and 15 3mg Melatonin. Pt with dislocation of R shoulder. Given this and white count, tox and ER team concerned for seizure activity. Intubated and shoulder reduction and whole bowel irrigation which was successful. Glenoid fx noted on xray. Patient's QRS and QTc within normal limits. Course complicated by HAL that improved. Patient evaluated by psychiatry and deemed him likely appropriate for inpatient [facility].    ASSESSMENT & PLAN:     #Neuro  - High clinical suspicion of seizure due to Effexor OD given leukocytosis + anterior shoulder dislocation  - prolactin level was 0.06  - regained mental status on 2/9 and extubated  - Toxicology recs appreciated  - Low concern for serotonin syndrome   - Psych consulted, f/u recs   -c/w 1:1  -PRN ativan for agitation    #Respiratory  - Extubated on 2/9    #Cardiovascular  - Hypertensive and tachycardic on presentation  - Currently hemodynamically stable  - daily CK  - d/c EKG QD monitoring, has been stable  - can give PRN hydral if needed for HTN    #Gastrointestinal  - S/p gastric lavage 2/2 ingestion of Effexor and melatonin, cleared  - NPO except meds    #/Renal  - new HAL w/ Cr going upwards to around 2, now resolving (1.1)  - possibly 2/2 osmotic diarrhea  - IVF for 8 hrs (2L LR), now on oral fluids  - QD BMP w/ Mg Phos  - Monitor urinary output    #Heme  - Leukocytosis resolving  - Daily CBC    #Endocrine  - No acute issues  - TSH 1.84  -if FS low, can give amp    #ID  - No acute issues; leukocytosis likely reactive 2/2 seizure  - Monitor vitals    #MSK  - R anterior shoulder reduction likely 2/2 seizure, reduced in ED. Per ortho, maintain R shoulder in sling  - F/u CT RUE  -to f/u w/ ortho 1-2 weeks after discharge    #Prophylactic Measures  - DVT prophylaxis: lovenox  - Diet: NPO  - Dispo: Pending clinical status        FOR FOLLOW UP:  CT UE to aseess fx.  Pt to follow up with Dr. Hein 1-2 weeks after discharge.  Hold venlafaxine  Pt will need 2PC given severity of OD and concern for impulsivity  Ativan prn  Continue 1:1 MICU Transfer Note    Transfer from: MICU    Transfer to: (X) Medicine    (  ) Telemetry     (   ) RCU        (    ) Palliative         (   ) Stroke Unit          (   ) __________________    Accepting Physician: Shade (Hospitalist)  Signout given to: Shade (Hospitalist)Avni (MAR)    MICU COURSE:   24 year old M PMH depression and anxiety presented to ER after suicide attempt by ingestion of 15 150mg Venlafaxine and 15 3mg Melatonin. Pt with dislocation of R shoulder. Given this and white count, tox and ER team concerned for seizure activity. Intubated and shoulder reduction and whole bowel irrigation which was successful. Glenoid fx noted on xray. Patient's QRS and QTc within normal limits. Course complicated by HAL that improved. Patient evaluated by psychiatry and deemed him likely appropriate for inpatient [facility].    ASSESSMENT & PLAN:     #Neuro  - High clinical suspicion of seizure due to Effexor OD given leukocytosis + anterior shoulder dislocation  - prolactin level was 0.06  - regained mental status on 2/9 and extubated  - Toxicology recs appreciated  - Low concern for serotonin syndrome   - Psych consulted, f/u recs   -c/w 1:1  -PRN ativan for agitation    #Respiratory  - Extubated on 2/9    #Cardiovascular  - Hypertensive and tachycardic on presentation  - Currently hemodynamically stable  - daily CK  - d/c EKG QD monitoring, has been stable  - can give PRN hydral if needed for HTN    #Gastrointestinal  - S/p gastric lavage 2/2 ingestion of Effexor and melatonin, cleared  - NPO except meds    #/Renal  - new HAL w/ Cr going upwards to around 2, now resolving (1.1)  - possibly 2/2 osmotic diarrhea  - IVF for 8 hrs (2L LR), now on oral fluids  - QD BMP w/ Mg Phos  - Monitor urinary output    #Heme  - Leukocytosis resolving  - Daily CBC    #Endocrine  - No acute issues  - TSH 1.84  -if FS low, can give amp    #ID  - No acute issues; leukocytosis likely reactive 2/2 seizure  - Monitor vitals    #MSK  - R anterior shoulder reduction likely 2/2 seizure, reduced in ED. Per ortho, maintain R shoulder in sling  - F/u CT RUE  -to f/u w/ ortho 1-2 weeks after discharge    #Prophylactic Measures  - DVT prophylaxis: lovenox  - Diet: NPO  - Dispo: Pending clinical status        FOR FOLLOW UP:  CT UE to aseess fx.  Pt to follow up with Dr. Hein 1-2 weeks after discharge.  Hold venlafaxine  Pt will need 2PC given severity of OD and concern for impulsivity  Ativan prn  Continue 1:1 MICU Transfer Note    Transfer from: MICU    Transfer to: (X) Medicine    (  ) Telemetry     (   ) RCU        (    ) Palliative         (   ) Stroke Unit          (   ) __________________    Accepting Physician: Shade (Hospitalist)  Signout given to: Shade (Hospitalist)Avni (MAR)    MICU COURSE:   24 year old M PMH depression and anxiety presented to ER after suicide attempt by ingestion of 15 150mg Venlafaxine and 15 3mg Melatonin. Pt with dislocation of R shoulder. Given this and white count, tox and ER team concerned for seizure activity. Intubated and shoulder reduction and whole bowel irrigation which was successful. Glenoid fx noted on xray. Patient's QRS and QTc within normal limits. Course complicated by HAL that improved. Patient evaluated by Psychiatry and deemed him likely appropriate for [facility].    ASSESSMENT & PLAN:     #Neuro  - High clinical suspicion of seizure due to Effexor OD given leukocytosis + anterior shoulder dislocation  - prolactin level was 0.06  - regained mental status on 2/9 and extubated  - Toxicology recs appreciated  - Low concern for serotonin syndrome   - Psych consulted, f/u recs   - C/w 1:1  -PRN ativan for agitation    #Respiratory  - Extubated on 2/9  - Saturating well on RA; continue to monitor    #Cardiovascular  - Hypertensive and tachycardic on presentation  - Currently hemodynamically stable  - Daily CK  - d/c EKG QD monitoring, has been stable  - Can give PRN hydralazine if needed for HTN    #Gastrointestinal  - S/p gastric lavage 2/2 ingestion of Effexor and melatonin, cleared  - NPO except meds    #/Renal  - new HAL w/ Cr going upwards to around 2, now resolving (1.1)  - possibly 2/2 osmotic diarrhea  - IVF for 8 hrs (2L LR), now on oral fluids  - QD BMP w/Mg, Phos  - Monitor urinary output    #Heme  - Leukocytosis resolving; likely 2/2 seizure  - Daily CBC    #Endocrine  - No acute issues  - TSH 1.84  - If FS low, can give amp of dextrose    #ID  - No acute issues; leukocytosis likely reactive 2/2 seizure  - Monitor vitals    #MSK  - R anterior shoulder reduction likely 2/2 seizure, reduced in ED. Per ortho, maintain R shoulder in sling  - F/u CT RUE  - To f/u w/ Ortho 1-2 weeks after discharge    #Prophylactic Measures  - DVT prophylaxis: Lovenox  - Diet: NPO  - Dispo: Pending clinical status        FOR FOLLOW UP:  CT UE to aseess fx.  Pt to follow up with Dr. Hein 1-2 weeks after discharge.  Hold venlafaxine  Pt will need 2PC given severity of OD and concern for impulsivity  Ativan prn  Continue 1:1

## 2020-02-10 NOTE — CHART NOTE - NSCHARTNOTEFT_GEN_A_CORE
Brief Psych CL Attending Note:  Please see full consult performed by EARLE Juarez.    Chart reviewed, I read EARLE Juarez's history, MSE, A/P and agree with her with following additions. I personally saw the patient on 2/10/2020. He knows he is here for an overdose but cannot give much of the details leading to overdose. He acknowledges that he remains depressed. Appears somewhat guarded and minimizing situation. He denies active SI now. His affect is dysthymic and blunted. His speech is soft, with a slight prolonged latency. His thought process is linear. Dx MDD. Plan: - will admit 2PC given severity of overdose, concern for impulsivity, concern for minimization of his current mood which led to this admission. - hold venlafaxine for now- will defer to inpatient psych for new medication - ativan prn as per above - continue 1:1 CO for suicidality/impulsivity.

## 2020-02-10 NOTE — PROGRESS NOTE ADULT - SUBJECTIVE AND OBJECTIVE BOX
INTERVAL HPI/OVERNIGHT EVENTS:    SUBJECTIVE: Patient seen and examined at bedside. States he is feeling well, no CP, SOB, fevers, chills, abdominal pain, urine or bowel issues.     CONSTITUTIONAL: No weakness, fevers or chills  EYES/ENT: No visual changes;  No vertigo or throat pain   NECK: No pain or stiffness  RESPIRATORY: No cough, wheezing, hemoptysis; No shortness of breath  CARDIOVASCULAR: No chest pain or palpitations  GASTROINTESTINAL: No abdominal or epigastric pain. No nausea, vomiting, or hematemesis; No diarrhea or constipation. No melena or hematochezia.  GENITOURINARY: No dysuria, frequency or hematuria  NEUROLOGICAL: No numbness or weakness  SKIN: No itching, rashes    OBJECTIVE:    VITAL SIGNS:  ICU Vital Signs Last 24 Hrs  T(C): 36.7 (10 Feb 2020 04:00), Max: 36.7 (10 Feb 2020 00:00)  T(F): 98.1 (10 Feb 2020 04:00), Max: 98.1 (10 Feb 2020 00:00)  HR: 78 (10 Feb 2020 10:00) (59 - 115)  BP: 137/80 (10 Feb 2020 10:00) (113/72 - 180/93)  BP(mean): 94 (10 Feb 2020 10:00) (82 - 116)  ABP: --  ABP(mean): --  RR: 27 (10 Feb 2020 10:00) (12 - 29)  SpO2: 96% (10 Feb 2020 10:00) (88% - 100%)    Mode: standby    02-09 @ 07:01  -  02-10 @ 07:00  --------------------------------------------------------  IN: 3295 mL / OUT: 4275 mL / NET: -980 mL      CAPILLARY BLOOD GLUCOSE          PHYSICAL EXAM:    General: NAD  HEENT: NC/AT; PERRL, clear conjunctiva  Neck: supple  Respiratory: CTA b/l  Cardiovascular: +S1/S2; RRR  Abdomen: soft, NT/ND; +BS x4  Extremities: WWP, 2+ peripheral pulses b/l; no LE edema  Skin: normal color and turgor; no rash  Neurological:    MEDICATIONS:  MEDICATIONS  (STANDING):  chlorhexidine 4% Liquid 1 Application(s) Topical <User Schedule>  enoxaparin Injectable 40 milliGRAM(s) SubCutaneous daily  multivitamin 1 Tablet(s) Oral daily    MEDICATIONS  (PRN):  LORazepam   Injectable 1 milliGRAM(s) IV Push once PRN Agitation      ALLERGIES:  Allergies    No Known Allergies    Intolerances        LABS:                        12.4   9.57  )-----------( 190      ( 10 Feb 2020 00:10 )             36.8     02-10    137  |  99  |  12  ----------------------------<  86  3.9   |  25  |  1.13    Ca    8.6      10 Feb 2020 00:10  Phos  3.8     02-10  Mg     1.6     02-10    TPro  5.7<L>  /  Alb  3.1<L>  /  TBili  0.6  /  DBili  x   /  AST  43<H>  /  ALT  24  /  AlkPhos  71  02-10          RADIOLOGY & ADDITIONAL TESTS: Reviewed.

## 2020-02-10 NOTE — DIETITIAN INITIAL EVALUATION ADULT. - WEIGHT IN KG
OUTPATIENT PROGRESS NOTE  CC:  \"Follow up: Hypertension, DM Type II, Depression\"    HISTORY    HPI:  This is a 66 year old  female who presents to the clinic today for follow up on hypertension, diabetes type II, and depression.  Patient takes daily blood sugars in the morning that have been between 120-140, with occasional 160 if she eats pizza or dessert the night before. She denies any dizziness, lightheadedness, or syncope. No chest pain or SOB. Denies any abdominal pain, nausea or vomiting. She states she has no concerns today.   In Feb., her  had a massive MI that has been affecting the patient emotionally, however, she feels she is coping well and has open communication with her . She does not wish to speak to a Counsellor at this time and feels for now, her anti-depression medication is working well given the circumstances.     ALLERGIES:  No Known Allergies    Current Outpatient Prescriptions   Medication Sig Dispense Refill   • metFORMIN (GLUCOPHAGE) 500 MG tablet Take 2 tablets by mouth 2 times daily (with meals). 120 tablet 5   • methotrexate (RHEUMATREX) 2.5 MG tablet Take 10 tablets by mouth once a week. 120 tablet 1   • alendronate (FOSAMAX) 70 MG tablet Take 1 tablet by mouth every 7 days. 90 tablet 0   • amoxicillin (AMOXIL) 500 MG capsule Take 4 tablets prior to dental procedure 8 capsule 1   • omeprazole (PRILOSEC) 40 MG capsule Take 1 capsule by mouth daily. 90 capsule 3   • buPROPion (WELLBUTRIN SR) 150 MG 12 hr tablet Take 1 tablet by mouth 2 times daily. 180 tablet 3   • metoPROLOL (LOPRESSOR) 50 MG tablet Take 1 tablet by mouth 2 times daily. 180 tablet 3   • losartan (COZAAR) 50 MG tablet Take 1 tablet by mouth daily. 90 tablet 3   • gemfibrozil (LOPID) 600 MG tablet Take 1 tablet by mouth 2 times daily (before meals). 180 tablet 3   • DULoxetine (CYMBALTA) 60 MG capsule Take 1 capsule by mouth 2 times daily. 180 capsule 3   • methimazole (TAPAZOLE) 5 MG tablet Take 1 tablet by  mouth daily. 90 tablet 3   • pravastatin (PRAVACHOL) 40 MG tablet Take 1 tablet by mouth daily. 90 tablet 3   • acetaminophen-codeine (TYLENOL NO.3) 300-30 MG per tablet Take 1 tablet by mouth every 12 hours as needed for Pain. 60 tablet 0   • vitamin D, Cholecalciferol, 1000 UNITS capsule Take 1 capsule by mouth daily. 1 capsule 0   • Calcium Carbonate 1500 (600 CA) MG Tab Patient to take one tablet PO Daily with Vitamin D. OTC 1 tablet 0   • folic acid (FOLATE) 1 MG tablet Take 1 tablet by mouth daily. 90 tablet 3   • Tofacitinib Citrate (XELJANZ) 5 MG Tab Take 1 tablet by mouth 2 times daily. 180 tablet 1   • albuterol (PROAIR HFA) 108 (90 BASE) MCG/ACT inhaler Inhale 2 puffs into the lungs every 4 hours as needed (breathing). 3 Inhaler 3   • aspirin 325 MG tablet Take 325 mg by mouth daily.     • MULTIVITAMINS PO TABS 1 TABLET DAILY 0 0     No current facility-administered medications for this visit.          PHYSICAL EXAM   Alert, Oriented x 3 in no apparent distress  Vitals: Blood pressure 126/80, pulse 109, height 5' 2\" (1.575 m), weight 88.1 kg, last menstrual period 02/01/2001.  Constitutional: Patient is oriented to person, place, and time. Well-nourished. No distress.   HEENT:   Head: Normocephalic and atraumatic.   Eyes: Conjunctivae are normal.   Ears: TM are normal bilaterally.  Neck: Neck supple. No JVD present.  Cardiovascular: Regular rate and rhythm with no murmurs and +2/4 radial and distal pulses.    Pulmonary/Chest: Clear to auscultation. Effort normal. No respiratory distress. No wheezes, no rales, and no rhonchi.   Lymphadenopathy: No cervical adenopathy.   Lower Extremities: Normal monofilament test in bilateral feet. No ulcerations.   Neurological: Alert and oriented to person, place, and time.   Skin: Skin is warm and dry. No rash noted. No diaphoresis and no pallor.   Psychiatric: Mildly depressed mood and affect consistent with recent 's non-fatal MI and dealing with the aftermath.        A/P  66 year old female who presents to the clinic with follow up: Hypertension, DM Type II, Depression    1. Hypertension- well controlled. Continue on current medication and dose.  2. DM Type II- controlled, ordered A1C and CMP today. Advised to make appointment with optometry for eye exam.   3. Depression- patient agreeable to continue on current medications and will call if she feels that the depression is interfering with daily life. She does not wish to speak to with a Counsellor at this time regarding her 's MI.   4. Preventative- Ordered mammogram. Patient will call to schedule.    Follow up 6 months.      97

## 2020-02-11 ENCOUNTER — TRANSCRIPTION ENCOUNTER (OUTPATIENT)
Age: 25
End: 2020-02-11

## 2020-02-11 ENCOUNTER — INPATIENT (INPATIENT)
Facility: HOSPITAL | Age: 25
LOS: 12 days | Discharge: HOME | End: 2020-02-24
Attending: PSYCHIATRY & NEUROLOGY | Admitting: PSYCHIATRY & NEUROLOGY
Payer: COMMERCIAL

## 2020-02-11 VITALS
TEMPERATURE: 99 F | DIASTOLIC BLOOD PRESSURE: 88 MMHG | SYSTOLIC BLOOD PRESSURE: 137 MMHG | RESPIRATION RATE: 18 BRPM | OXYGEN SATURATION: 97 % | HEART RATE: 85 BPM

## 2020-02-11 VITALS — HEIGHT: 75 IN | WEIGHT: 315 LBS | TEMPERATURE: 98 F

## 2020-02-11 DIAGNOSIS — F32.9 MAJOR DEPRESSIVE DISORDER, SINGLE EPISODE, UNSPECIFIED: ICD-10-CM

## 2020-02-11 DIAGNOSIS — S43.004S UNSPECIFIED DISLOCATION OF RIGHT SHOULDER JOINT, SEQUELA: ICD-10-CM

## 2020-02-11 PROBLEM — F41.9 ANXIETY DISORDER, UNSPECIFIED: Chronic | Status: ACTIVE | Noted: 2020-02-07

## 2020-02-11 LAB
ANION GAP SERPL CALC-SCNC: 14 MMO/L — SIGNIFICANT CHANGE UP (ref 7–14)
BASOPHILS # BLD AUTO: 0.04 K/UL — SIGNIFICANT CHANGE UP (ref 0–0.2)
BASOPHILS NFR BLD AUTO: 0.6 % — SIGNIFICANT CHANGE UP (ref 0–2)
BUN SERPL-MCNC: 11 MG/DL — SIGNIFICANT CHANGE UP (ref 7–23)
CALCIUM SERPL-MCNC: 9.2 MG/DL — SIGNIFICANT CHANGE UP (ref 8.4–10.5)
CHLORIDE SERPL-SCNC: 98 MMOL/L — SIGNIFICANT CHANGE UP (ref 98–107)
CK SERPL-CCNC: 640 U/L — HIGH (ref 30–200)
CO2 SERPL-SCNC: 23 MMOL/L — SIGNIFICANT CHANGE UP (ref 22–31)
CREAT SERPL-MCNC: 1 MG/DL — SIGNIFICANT CHANGE UP (ref 0.5–1.3)
EOSINOPHIL # BLD AUTO: 0.21 K/UL — SIGNIFICANT CHANGE UP (ref 0–0.5)
EOSINOPHIL NFR BLD AUTO: 3 % — SIGNIFICANT CHANGE UP (ref 0–6)
GLUCOSE SERPL-MCNC: 102 MG/DL — HIGH (ref 70–99)
HCT VFR BLD CALC: 39.4 % — SIGNIFICANT CHANGE UP (ref 39–50)
HGB BLD-MCNC: 13.4 G/DL — SIGNIFICANT CHANGE UP (ref 13–17)
IMM GRANULOCYTES NFR BLD AUTO: 0.6 % — SIGNIFICANT CHANGE UP (ref 0–1.5)
LYMPHOCYTES # BLD AUTO: 1.3 K/UL — SIGNIFICANT CHANGE UP (ref 1–3.3)
LYMPHOCYTES # BLD AUTO: 18.4 % — SIGNIFICANT CHANGE UP (ref 13–44)
MAGNESIUM SERPL-MCNC: 1.5 MG/DL — LOW (ref 1.6–2.6)
MCHC RBC-ENTMCNC: 28.5 PG — SIGNIFICANT CHANGE UP (ref 27–34)
MCHC RBC-ENTMCNC: 34 % — SIGNIFICANT CHANGE UP (ref 32–36)
MCV RBC AUTO: 83.8 FL — SIGNIFICANT CHANGE UP (ref 80–100)
MONOCYTES # BLD AUTO: 0.77 K/UL — SIGNIFICANT CHANGE UP (ref 0–0.9)
MONOCYTES NFR BLD AUTO: 10.9 % — SIGNIFICANT CHANGE UP (ref 2–14)
NEUTROPHILS # BLD AUTO: 4.71 K/UL — SIGNIFICANT CHANGE UP (ref 1.8–7.4)
NEUTROPHILS NFR BLD AUTO: 66.5 % — SIGNIFICANT CHANGE UP (ref 43–77)
NRBC # FLD: 0 K/UL — SIGNIFICANT CHANGE UP (ref 0–0)
PHOSPHATE SERPL-MCNC: 3.8 MG/DL — SIGNIFICANT CHANGE UP (ref 2.5–4.5)
PLATELET # BLD AUTO: 216 K/UL — SIGNIFICANT CHANGE UP (ref 150–400)
PMV BLD: 9.1 FL — SIGNIFICANT CHANGE UP (ref 7–13)
POTASSIUM SERPL-MCNC: 3.9 MMOL/L — SIGNIFICANT CHANGE UP (ref 3.5–5.3)
POTASSIUM SERPL-SCNC: 3.9 MMOL/L — SIGNIFICANT CHANGE UP (ref 3.5–5.3)
RBC # BLD: 4.7 M/UL — SIGNIFICANT CHANGE UP (ref 4.2–5.8)
RBC # FLD: 12.5 % — SIGNIFICANT CHANGE UP (ref 10.3–14.5)
SODIUM SERPL-SCNC: 135 MMOL/L — SIGNIFICANT CHANGE UP (ref 135–145)
WBC # BLD: 7.07 K/UL — SIGNIFICANT CHANGE UP (ref 3.8–10.5)
WBC # FLD AUTO: 7.07 K/UL — SIGNIFICANT CHANGE UP (ref 3.8–10.5)

## 2020-02-11 PROCEDURE — 99233 SBSQ HOSP IP/OBS HIGH 50: CPT

## 2020-02-11 PROCEDURE — 93010 ELECTROCARDIOGRAM REPORT: CPT

## 2020-02-11 PROCEDURE — 73200 CT UPPER EXTREMITY W/O DYE: CPT | Mod: 26,RT

## 2020-02-11 PROCEDURE — 99239 HOSP IP/OBS DSCHRG MGMT >30: CPT

## 2020-02-11 PROCEDURE — 99221 1ST HOSP IP/OBS SF/LOW 40: CPT

## 2020-02-11 RX ORDER — HALOPERIDOL DECANOATE 100 MG/ML
5 INJECTION INTRAMUSCULAR EVERY 6 HOURS
Refills: 0 | Status: DISCONTINUED | OUTPATIENT
Start: 2020-02-11 | End: 2020-02-24

## 2020-02-11 RX ORDER — HALOPERIDOL DECANOATE 100 MG/ML
5 INJECTION INTRAMUSCULAR ONCE
Refills: 0 | Status: DISCONTINUED | OUTPATIENT
Start: 2020-02-11 | End: 2020-02-24

## 2020-02-11 RX ORDER — DIPHENHYDRAMINE HCL 50 MG
50 CAPSULE ORAL ONCE
Refills: 0 | Status: DISCONTINUED | OUTPATIENT
Start: 2020-02-11 | End: 2020-02-24

## 2020-02-11 RX ORDER — MAGNESIUM SULFATE 500 MG/ML
1 VIAL (ML) INJECTION ONCE
Refills: 0 | Status: COMPLETED | OUTPATIENT
Start: 2020-02-11 | End: 2020-02-11

## 2020-02-11 RX ORDER — MAGNESIUM SULFATE 500 MG/ML
2 VIAL (ML) INJECTION ONCE
Refills: 0 | Status: DISCONTINUED | OUTPATIENT
Start: 2020-02-11 | End: 2020-02-11

## 2020-02-11 RX ORDER — DIPHENHYDRAMINE HCL 50 MG
50 CAPSULE ORAL EVERY 6 HOURS
Refills: 0 | Status: DISCONTINUED | OUTPATIENT
Start: 2020-02-11 | End: 2020-02-24

## 2020-02-11 RX ADMIN — ENOXAPARIN SODIUM 40 MILLIGRAM(S): 100 INJECTION SUBCUTANEOUS at 11:22

## 2020-02-11 RX ADMIN — Medication 1 TABLET(S): at 11:22

## 2020-02-11 RX ADMIN — Medication 100 GRAM(S): at 05:02

## 2020-02-11 NOTE — PHYSICAL THERAPY INITIAL EVALUATION ADULT - PERTINENT HX OF CURRENT PROBLEM, REHAB EVAL
This is a 24y M with PMHx depression and anxiety presents with suicide attempt via overdose on Venlafaxine and Melatonin, admitted to MICU for respiratory support and monitoring. Pt with R anterior shoulder dislocation likely 2/2 seizure and s/p fall at home as per chart note, reduced in ED. Per ortho, maintain R shoulder in sling.

## 2020-02-11 NOTE — PROGRESS NOTE ADULT - PROBLEM/PLAN-2
DISPLAY PLAN FREE TEXT
Pt feels "much better". Lung exam improved, HA free. Wants to go home. Educated on care and f/u.

## 2020-02-11 NOTE — DISCHARGE NOTE PROVIDER - PROVIDER RX CONTACT NUMBER
[phone] Airway patent, Nasal mucosa clear. Mouth with normal mucosa. Throat has no vesicles, no oropharyngeal exudates and uvula is midline.

## 2020-02-11 NOTE — DISCHARGE NOTE PROVIDER - HOSPITAL COURSE
24 year old [facility] depression and anxiety presented to ER after suicide attempt by ingestion of 15 150mg Venlafaxine and 15 3mg Melatonin. Pt with dislocation of R shoulder. Given this and white count there was concern  for seizure activity.Pt was  Intubated and shoulder reduction and whole bowel irrigation performed , which was successful. Glenoid fx was noted on post reduction X-rays.      Patient's QRS and QTc within normal limits. Course complicated by HAL that improved.     Patient evaluated by Psychiatry and deemed him likely appropriate for [facility]. Pt was monitored on the floors after ICU stay and is now stable for transfer to [facility] for further psychiatric care.

## 2020-02-11 NOTE — PROGRESS NOTE BEHAVIORAL HEALTH - NSBHCHARTREVIEWVS_PSY_A_CORE FT
Vital Signs Last 24 Hrs  T(C): 37.1 (11 Feb 2020 13:34), Max: 37.3 (10 Feb 2020 20:00)  T(F): 98.7 (11 Feb 2020 13:34), Max: 99.1 (10 Feb 2020 20:00)  HR: 85 (11 Feb 2020 13:34) (71 - 99)  BP: 137/88 (11 Feb 2020 13:34) (132/87 - 152/92)  BP(mean): 106 (10 Feb 2020 22:00) (98 - 106)  RR: 18 (11 Feb 2020 13:34) (18 - 25)  SpO2: 97% (11 Feb 2020 13:34) (93% - 99%)

## 2020-02-11 NOTE — CONSULT NOTE ADULT - SUBJECTIVE AND OBJECTIVE BOX
24yMale c/o right shoulder pain s/p shoulder dislocation and reduction by ED . Pt presented late on 2/7 after suicide attempt and ingestion of medication. Per ED presumed seizure while unconscious presented with Right shoulder dislocation and pain. Patient denies numbness or tingling in the RUE. Patient denies any other injuries.    ROS: 10 point ROS otherwise negative    PMH:  Depression, unspecified depression type  Anxiety  No pertinent past medical history    PSH:  No significant past surgical history    AH:    Meds: See med rec    T(C): 37.1 (02-11-20 @ 13:34)  HR: 85 (02-11-20 @ 13:34)  BP: 137/88 (02-11-20 @ 13:34)  RR: 18 (02-11-20 @ 13:34)  SpO2: 97% (02-11-20 @ 13:34)  Wt(kg): --    Gen: NAD  Resp: Unlabored breathing  PE RUE:  Skin intact,    SILT axillary/med/rad/ulnar  +Motor AIN/PIN/Ulnar/Radial/Musc/Median,   +painless elbow/wrist ROM, ,   2+radial pulse, soft compartments.      Imaging:    CT of right shoulder reviewed with Dr. Guerrier of radiology:  FU official read  humeral head located in glenoid  glenoid fracture     < from: Xray Shoulder 2 Views, Right (02.07.20 @ 19:21) >  FINDINGS/  IMPRESSION:   Anterior dislocation of the right shoulder. Minimally displaced inferior right glenoid fracture.  No AC joint arthropathy. No abnormal soft tissue swelling or mineralization. Imaged portions of the lung are clear.    < end of copied text >          24yMale s/p reduction of right shoulder dislocation by ED    pain control  NWB in sling  PT/OT  ROM of fingers wrist and elbow  no ROM of shoulder  No acute orthopaedic intervention  follow up with Dr. Hein in 1 week, call to make appointment [phone]  discussed with Dr. Fan

## 2020-02-11 NOTE — CHART NOTE - NSCHARTNOTEFT_GEN_A_CORE
s/w ortho - CT RUE reviewed. Pt cleared for d/c to [facility].  Pt to follow up with Dr. Hein in 1 week, call to make appointment [phone]

## 2020-02-11 NOTE — PROGRESS NOTE ADULT - PROBLEM SELECTOR PLAN 1
.    WBI discontinued overnight with likely ghost pills noted to be seen yesterday.  Given QRS intervals have been within normal limits, patient will likely not have further toxicity from here on out.  Nonetheless, while intubated, will continue to have patient on cardiac monitor but can get ECGs further apart.    -continuous cardiac monitoring  -can get serial ECGs [id number]  -agree with discontinuation of whole bowel irrigation  -for QRS >120msec, please treat with boluses of 50 mEq sodium bicarbonate IV until QRS narrows  -for ventricular dysrhythmias, lidocaine is antidysrhythmic of choice  -benzodiazepines as needed for seizures    After extubation, if patient is well appearing with normal vital signs and narrow QRS interval, should be clearable to psychiatry.    Case discussed with ICU.  Please page toxicology with any questions at [phone].
Stool is noted in rectal effluent, would continue whole bowel irrigation (WBI) until rectal effluent is clear.  Ideally would extubate as soon as possible but after completion of WBI.  ECGs have shown normal QRS and has not required sodium bicarbonate boluses overnight.    -continuous cardiac monitoring and serial ECGs  -for QRS >120msec, please treat with boluses of 50 mEq sodium bicarbonate IV until QRS narrows  -for ventricular dysrhythmias, lidocaine is antidysrhythmic of choice  -continuous core temperature monitoring  -if temperature becomes >105 degrees F, please cool aggressively with ice immersion  -please monitor for rigidity and treat with benzodiazepines as needed for rigidity of extremities  -benzodiazepines as needed for seizures  -continue whole bowel irrigation or polyethylene glycol at 1-2 L/hr until rectal effluent is clear    Case discussed with ICU.  Please page toxicology with any questions at [phone].
s/p intubation and admission to [facility]  s/p gastric lavage  cleared by Toxicology  extubated 2/9  medically stable for d/c to [facility]

## 2020-02-11 NOTE — PROGRESS NOTE BEHAVIORAL HEALTH - NSBHCHARTREVIEWLAB_PSY_A_CORE FT
CBC Full  -  ( 11 Feb 2020 03:00 )  WBC Count : 7.07 K/uL  RBC Count : 4.70 M/uL  Hemoglobin : 13.4 g/dL  Hematocrit : 39.4 %  Platelet Count - Automated : 216 K/uL  Mean Cell Volume : 83.8 fL  Mean Cell Hemoglobin : 28.5 pg  Mean Cell Hemoglobin Concentration : 34.0 %  Auto Neutrophil # : 4.71 K/uL  Auto Lymphocyte # : 1.30 K/uL  Auto Monocyte # : 0.77 K/uL  Auto Eosinophil # : 0.21 K/uL  Auto Basophil # : 0.04 K/uL  Auto Neutrophil % : 66.5 %  Auto Lymphocyte % : 18.4 %  Auto Monocyte % : 10.9 %  Auto Eosinophil % : 3.0 %  Auto Basophil % : 0.6 %    02-11    135  |  98  |  11  ----------------------------<  102<H>  3.9   |  23  |  1.00    Ca    9.2      11 Feb 2020 03:00  Phos  3.8     02-11  Mg     1.5     02-11    TPro  5.7<L>  /  Alb  3.1<L>  /  TBili  0.6  /  DBili  x   /  AST  43<H>  /  ALT  24  /  AlkPhos  71  02-10    LIVER FUNCTIONS - ( 10 Feb 2020 00:10 )  Alb: 3.1 g/dL / Pro: 5.7 g/dL / ALK PHOS: 71 u/L / ALT: 24 u/L / AST: 43 u/L / GGT: x

## 2020-02-11 NOTE — PROGRESS NOTE ADULT - SUBJECTIVE AND OBJECTIVE BOX
Patient is a 24y old  Male who presents with a chief complaint of Suicide attempt (10 Feb 2020 10:28)        SUBJECTIVE / OVERNIGHT EVENTS:  no acute events o/n  pt denies all complaints  no pain in shoulder, just discomofort  agreeable to go to [facility]     MEDICATIONS  (STANDING):  enoxaparin Injectable 40 milliGRAM(s) SubCutaneous daily  multivitamin 1 Tablet(s) Oral daily    MEDICATIONS  (PRN):  LORazepam   Injectable 1 milliGRAM(s) IV Push once PRN Agitation      Vital Signs Last 24 Hrs  T(C): 37.1 (11 Feb 2020 07:46), Max: 37.3 (10 Feb 2020 20:00)  T(F): 98.7 (11 Feb 2020 07:46), Max: 99.1 (10 Feb 2020 20:00)  HR: 77 (11 Feb 2020 07:46) (71 - 99)  BP: 136/86 (11 Feb 2020 07:46) (132/87 - 152/92)  BP(mean): 106 (10 Feb 2020 22:00) (98 - 106)  RR: 18 (11 Feb 2020 07:46) (18 - 27)  SpO2: 99% (11 Feb 2020 07:46) (93% - 99%)  CAPILLARY BLOOD GLUCOSE        I&O's Summary    10 Feb 2020 07:01  -  11 Feb 2020 07:00  --------------------------------------------------------  IN: 1550 mL / OUT: 1250 mL / NET: 300 mL          PHYSICAL EXAM:    General: NAD, obese  HEENT: NC/AT; clear conjunctiva  Neck: supple  Respiratory: CTAB no w/r/r   Cardiovascular: +S1/S2; RRR  Abdomen: soft, NT/ND; +BS x4  Extremities: WWP, 2+ peripheral pulses b/l; no LE edema    LABS:                        13.4   7.07  )-----------( 216      ( 11 Feb 2020 03:00 )             39.4     02-11    135  |  98  |  11  ----------------------------<  102<H>  3.9   |  23  |  1.00    Ca    9.2      11 Feb 2020 03:00  Phos  3.8     02-11  Mg     1.5     02-11    TPro  5.7<L>  /  Alb  3.1<L>  /  TBili  0.6  /  DBili  x   /  AST  43<H>  /  ALT  24  /  AlkPhos  71  02-10      CARDIAC MARKERS ( 11 Feb 2020 03:00 )  x     / x     / 640 u/L / x     / x      CARDIAC MARKERS ( 10 Feb 2020 00:10 )  x     / x     / 1374 u/L / x     / x              RADIOLOGY & ADDITIONAL TESTS:    Imaging Personally Reviewed:  Consultant(s) Notes Reviewed:    Care Discussed with Consultants/Other Providers:

## 2020-02-11 NOTE — PHYSICAL THERAPY INITIAL EVALUATION ADULT - CRITERIA FOR SKILLED THERAPEUTIC INTERVENTIONS
PT evaluation done. Patient is functioning well, ambulates without assistive device and without any loss of balance, hence Restorative PT is not indicated at this time.

## 2020-02-11 NOTE — PROGRESS NOTE BEHAVIORAL HEALTH - RISK ASSESSMENT
Pt with high risk of self-harm given this suicide attempt; warrants inpatient level of psychiatric care at this time.

## 2020-02-11 NOTE — PROGRESS NOTE BEHAVIORAL HEALTH - NSBHFUPINTERVALHXFT_PSY_A_CORE
Patient seen/evaluated in follow up, chart reviewed. No events or prns overnight. Patient reports today mood is a bit better, is relieved he didn't die, understands he needs to go to [facility] but is worried about needing to return to work. No current SI/HI, no psychotic sx. Pt is A&Ox3.

## 2020-02-11 NOTE — PROGRESS NOTE BEHAVIORAL HEALTH - NSBHADMITCOUNSEL_PSY_A_CORE
instructions for management, treatment and follow up/client/family/caregiver education/risk factor reduction/prognosis/importance of adherence to chosen treatment

## 2020-02-11 NOTE — DISCHARGE NOTE PROVIDER - CARE PROVIDER_API CALL
Adriel Hein)  Orthopedics  [address]  [place] [address]  Phone: [phone]  Fax: [phone]  Follow Up Time:

## 2020-02-11 NOTE — PHYSICAL THERAPY INITIAL EVALUATION ADULT - ACTIVE RANGE OF MOTION EXAMINATION, REHAB EVAL
Left UE Active ROM was WFL (within functional limits)/RUE in a sling with elbow flexion 90 degrees , wrist and finger ROM WFL/bilateral  lower extremity Active ROM was WFL (within functional limits)

## 2020-02-11 NOTE — PROGRESS NOTE ADULT - ASSESSMENT
24M PMHx depression and anxiety presents with suicide attempt via overdose on Venlafaxine and Melatonin, admitted to MICU for respiratory support and monitoring, now extubated and transferred to the floor

## 2020-02-11 NOTE — PROGRESS NOTE BEHAVIORAL HEALTH - NSBHADMITCOORDWITH_PSY_A_CORE
social work/medical staff/Arranging transfer to [facility] for further stabilization and treatment.

## 2020-02-11 NOTE — PROGRESS NOTE BEHAVIORAL HEALTH - SUMMARY
23 yo , single male, residing with family with PPHx MDD, and anxiety presents to [facility] ED with suicide attempt via overdose on Venlafaxine and Melatonin, sustained dislocated right shoulder. [facility] psychiatry consulted s/p extubation for s/p sa.  Pt states his overdose was "a cry for help," states feels his depression started with bullying from a teacher when in 5th grade and from other kids, also states all his friends "blocked me" on their cellphones, no hobbies or interests at this time, states sleep/appetite ok, denies a/v hallucinations/delusions, denies si @ present, Patient recently discharged from [facility] in January, saw outpatient psychiatrist whom he liked but does not know his name, states he wants to go home to go to work tomorrow and does not want to go back to [facility].    PLAN:  - will admit 2PC given severity of overdose, concern for impulsivity, concern for minimization of his current mood which led to this admission; pt now medically cleared, will transfer today to [facility] unit Low3  - hold venlafaxine for now- will defer to inpatient psych for new medication  - ativan prn as per above  - continue 1:1 CO for suicidality/impulsivity while in medical hospital

## 2020-02-11 NOTE — PHYSICAL THERAPY INITIAL EVALUATION ADULT - PATIENT PROFILE REVIEW, REHAB EVAL
yes/activity order- increase as tolerated, OK to perform PT evaluation as per SHAYY Ramos yes/activity order- increase as tolerated, OK to perform PT evaluation as per SHAYY Lazar

## 2020-02-11 NOTE — DISCHARGE NOTE PROVIDER - NSDCCPCAREPLAN_GEN_ALL_CORE_FT
PRINCIPAL DISCHARGE DIAGNOSIS  Diagnosis: Intentional drug overdose, initial encounter  Assessment and Plan of Treatment: You were intubated and underwent gastric lavage. You are now stable for transfer to [facility].      SECONDARY DISCHARGE DIAGNOSES  Diagnosis: Dislocation of right shoulder joint  Assessment and Plan of Treatment: status post reduction. PRINCIPAL DISCHARGE DIAGNOSIS  Diagnosis: Intentional drug overdose, initial encounter  Assessment and Plan of Treatment: You were intubated and underwent gastric lavage. You are now stable for transfer to [facility].      SECONDARY DISCHARGE DIAGNOSES  Diagnosis: Dislocation of right shoulder joint  Assessment and Plan of Treatment: status post reduction. Please follow up with Dr. Hein in two  weeks, call to schedule an  appointment [phone].

## 2020-02-11 NOTE — DISCHARGE NOTE NURSING/CASE MANAGEMENT/SOCIAL WORK - PATIENT PORTAL LINK FT
You can access the FollowMyHealth Patient Portal offered by [facility] by registering at the following website: [email or web address]. By joining [facility]’s FollowMyHealth portal, you will also be able to view your health information using other applications (apps) compatible with our system.

## 2020-02-11 NOTE — PROGRESS NOTE ADULT - PROBLEM SELECTOR PROBLEM 1
Intentional drug overdose, initial encounter

## 2020-02-11 NOTE — PROGRESS NOTE ADULT - ATTENDING COMMENTS
24 M with depression and anxiety here after suicide attempt by ingestion venlafaxine and melatonin, possible seizure related to this and dislocated shoulder joint, intubated for shoulder joint reduction and whole bowel irrigation given ingestion.  Patient is done with [facility] and now doing well. Will attempt to extubate. Will need psych eval once extubated.
24 M with depression and anxiety here after suicide attempt by ingestion venlafaxine and melatonin, possible seizure related to this and dislocated shoulder joint, intubated for shoulder joint reduction and whole bowel irrigation given ingestion.  Started on polyethylene glycol.  QRS within normal range.  Will get serial EKGs, serial labs to monitor for electrolytes. TTE shows grossly normal LV function but with poor cardiac windows.
MD Munson:  patient seen and evaluated with the fellow.  I was present for key portions of the history & physical, and I agree with the impression & plan.  DC whole bowel irrigation with goal to extubate.  Effects of venlafaxine should have peaked by this time.
MD Munson:  patient seen and evaluated with the fellow.  I was present for key portions of the history & physical, and I agree with the impression & plan.  May dc WBI, goal to extubate.
Pt is medically stable for d/c to [facility], awaiting bed

## 2020-02-11 NOTE — PHYSICAL THERAPY INITIAL EVALUATION ADULT - PHYSICAL ASSIST/NONPHYSICAL ASSIST: GAIT, REHAB EVAL
Face to Face Note  -  Durable Medical Equipment    Ally Mojica D.O. - NPI: [id number]  I certify that this patient is under my care and that they had a durable medical equipment(DME)face to face encounter by myself that meets the physician DME face-to-face encounter requirements with this patient on:    Date of encounter:   Patient:                    MRN:                       YOB: 2020  Amanda Bae  [id number]  1983     The encounter with the patient was in whole, or in part, for the following medical condition, which is the primary reason for durable medical equipment:  Other - metastatic cancer    I certify that, based on my findings, the following durable medical equipment is medically necessary:  Walkers.    HOME O2 Saturation Measurements:(Values must be present for Home Oxygen orders)         ,     ,         My Clinical findings support the need for the above equipment due to:  Abnormal Gait    Supporting Symptoms:      supervision

## 2020-02-11 NOTE — PROGRESS NOTE ADULT - PROBLEM SELECTOR PLAN 2
seen by Ortho  s/p reduction in ED  maintain R shoulder in sling, NWB  f/u Ortho 1-2 weeks after discharge

## 2020-02-12 PROCEDURE — 99232 SBSQ HOSP IP/OBS MODERATE 35: CPT

## 2020-02-12 PROCEDURE — 99223 1ST HOSP IP/OBS HIGH 75: CPT | Mod: AI

## 2020-02-12 RX ORDER — SERTRALINE 25 MG/1
25 TABLET, FILM COATED ORAL DAILY
Refills: 0 | Status: DISCONTINUED | OUTPATIENT
Start: 2020-02-12 | End: 2020-02-14

## 2020-02-12 RX ORDER — HYDROXYZINE HCL 10 MG
25 TABLET ORAL EVERY 8 HOURS
Refills: 0 | Status: DISCONTINUED | OUTPATIENT
Start: 2020-02-12 | End: 2020-02-24

## 2020-02-12 RX ADMIN — Medication 25 MILLIGRAM(S): at 12:25

## 2020-02-12 NOTE — CONSULT NOTE ADULT - SUBJECTIVE AND OBJECTIVE BOX
HPI: 24M PMHx depression and anxiety present s/p suicide attempt. Per ED documentation, patient  ingested approximately 15-18 Venlafaxine 150 mg tablets and 15 Melatonin 5 mg pills in his home for the purpose of ending his life. He went to sleep and awoke at a later time, at which point he attempted unsuccessfully to induce emesis to remove the pills from his stomach.  Pt with possible seizure related to this and dislocated shoulder joint.. Toxicology + for benzos, cannabinoids, and phencyclidine. s/p gastric lavage.  Xray - R anterior shoulder dislocation,  Pt was intubated for  airway protection , shoulder joint reduction and whole bowel irrigation given  Patient was monitored with serial EKG; QRS and QTc within normal limits. Course complicated by HAL that improved with IVF.  During my interview, pt feels well. Right arm in sling. denies pain. Reports improvement in shoulder mobility. Denies tingling, numbness or weakness    PAST MEDICAL & SURGICAL HISTORY:  Depression, unspecified depression type  Anxiety  No significant past surgical history      Review of Systems:   CONSTITUTIONAL: No fever, weight loss, or fatigue  EYES: No eye pain, visual disturbances, or discharge  ENMT:  No difficulty hearing, tinnitus, vertigo; No sinus or throat pain  NECK: No pain or stiffness  RESPIRATORY: No cough, wheezing, chills or hemoptysis; No shortness of breath  CARDIOVASCULAR: No chest pain, palpitations, dizziness, or leg swelling  GASTROINTESTINAL: No abdominal or epigastric pain. No nausea, vomiting, or hematemesis; No diarrhea or constipation. No melena or hematochezia.  GENITOURINARY: No dysuria, frequency, hematuria, or incontinence  NEUROLOGICAL: No headaches, memory loss, loss of strength, numbness, or tremors  SKIN: No itching, burning, rashes, or lesions   LYMPH NODES: No enlarged glands  ENDOCRINE: No heat or cold intolerance; No hair loss  MUSCULOSKELETAL: No joint pain or swelling; No muscle, back, or extremity pain  HEME/LYMPH: No easy bruising, or bleeding gums  ALLERY AND IMMUNOLOGIC: No hives or eczema    Allergies    Seasonal allergies   Intolerances        Social History:  Denies smoking, used to smoke occasionally before  Denies ETOH use     FAMILY HISTORY:  No pertinent family history in first degree relatives      MEDICATIONS  (STANDING):  sertraline 25 milliGRAM(s) Oral daily    MEDICATIONS  (PRN):  diphenhydrAMINE 50 milliGRAM(s) Oral every 6 hours PRN agitation/ insomnia  diphenhydrAMINE   Injectable 50 milliGRAM(s) IntraMuscular once PRN agitation  haloperidol     Tablet 5 milliGRAM(s) Oral every 6 hours PRN agitation  haloperidol    Injectable 5 milliGRAM(s) IntraMuscular once PRN agitation  LORazepam     Tablet 2 milliGRAM(s) Oral every 6 hours PRN agitation  LORazepam   Injectable 2 milliGRAM(s) IntraMuscular once PRN agitation      Vital Signs Last 24 Hrs  T(C): 37.1 (12 Feb 2020 09:55), Max: 37.2 (12 Feb 2020 08:06)  T(F): 98.7 (12 Feb 2020 09:55), Max: 99 (12 Feb 2020 08:06)  HR: 85 (11 Feb 2020 13:34) (85 - 85)  BP: 137/88 (11 Feb 2020 13:34) (137/88 - 137/88)  BP(mean): --  RR: 18 (11 Feb 2020 13:34) (18 - 18)  SpO2: 97% (11 Feb 2020 13:34) (97% - 97%)  CAPILLARY BLOOD GLUCOSE            PHYSICAL EXAM:  GENERAL: NAD, well-developed  HEAD:  Atraumatic, Normocephalic  EYES: EOMI, conjunctiva and sclera clear  NECK: Supple, No JVD  CHEST/LUNG: Clear to auscultation bilaterally; No wheeze  HEART: Regular rate and rhythm; No murmurs, rubs, or gallops  ABDOMEN: Soft, Nontender, Nondistended; Bowel sounds present  EXTREMITIES:  2+ Peripheral Pulses, No clubbing, cyanosis, or edema  NEUROLOGY: non-focal  SKIN: No rashes or lesions  RIGHT SHOULDER - in sling     LABS:                        13.4   7.07  )-----------( 216      ( 11 Feb 2020 03:00 )             39.4     02-11    135  |  98  |  11  ----------------------------<  102<H>  3.9   |  23  |  1.00    Ca    9.2      11 Feb 2020 03:00  Phos  3.8     02-11  Mg     1.5     02-11        CARDIAC MARKERS ( 11 Feb 2020 03:00 )  x     / x     / 640 u/L / x     / x              EKG(personally reviewed):    RADIOLOGY & ADDITIONAL TESTS:    Imaging Personally Reviewed:    Consultant(s) Notes Reviewed:      Care Discussed with Consultants/Other Providers: HPI: 24M PMHx depression and anxiety present s/p suicide attempt. Per ED documentation, patient  ingested approximately 15-18 Venlafaxine 150 mg tablets and 15 Melatonin 5 mg pills in his home for the purpose of ending his life. He went to sleep and awoke at a later time, at which point he attempted unsuccessfully to induce emesis to remove the pills from his stomach.  Pt with possible seizure related to this and dislocated shoulder joint.. Toxicology + for benzos, cannabinoids, and phencyclidine. s/p gastric lavage.  Xray - R anterior shoulder dislocation,  Pt was intubated for  airway protection , shoulder joint reduction and whole bowel irrigation given  Patient was monitored with serial EKG; QRS and QTc within normal limits. Course complicated by HAL that improved with IVF.    During my interview, pt feels well. Calm cooperative  Right arm in sling. Denies pain. Reports improvement in shoulder mobility. Denies tingling, numbness or weakness    PAST MEDICAL & SURGICAL HISTORY:  Depression, unspecified depression type  Anxiety  No significant past surgical history      Review of Systems:   CONSTITUTIONAL: No fever, weight loss, or fatigue  EYES: No eye pain, visual disturbances, or discharge  ENMT:  No difficulty hearing, tinnitus, vertigo; No sinus or throat pain  NECK: No pain or stiffness  RESPIRATORY: No cough, wheezing, chills or hemoptysis; No shortness of breath  CARDIOVASCULAR: No chest pain, palpitations, dizziness, or leg swelling  GASTROINTESTINAL: No abdominal or epigastric pain. No nausea, vomiting, or hematemesis; No diarrhea or constipation. No melena or hematochezia.  GENITOURINARY: No dysuria, frequency, hematuria, or incontinence  NEUROLOGICAL: No headaches, memory loss, loss of strength, numbness, or tremors  SKIN: No itching, burning, rashes, or lesions   LYMPH NODES: No enlarged glands  ENDOCRINE: No heat or cold intolerance; No hair loss  MUSCULOSKELETAL: No joint pain or swelling; No muscle, back, or extremity pain  HEME/LYMPH: No easy bruising, or bleeding gums  ALLERY AND IMMUNOLOGIC: No hives or eczema    Allergies    Seasonal allergies   Intolerances        Social History:  Denies smoking, used to smoke occasionally before  Denies ETOH use     FAMILY HISTORY:  No pertinent family history in first degree relatives      MEDICATIONS  (STANDING):  sertraline 25 milliGRAM(s) Oral daily    MEDICATIONS  (PRN):  diphenhydrAMINE 50 milliGRAM(s) Oral every 6 hours PRN agitation/ insomnia  diphenhydrAMINE   Injectable 50 milliGRAM(s) IntraMuscular once PRN agitation  haloperidol     Tablet 5 milliGRAM(s) Oral every 6 hours PRN agitation  haloperidol    Injectable 5 milliGRAM(s) IntraMuscular once PRN agitation  LORazepam     Tablet 2 milliGRAM(s) Oral every 6 hours PRN agitation  LORazepam   Injectable 2 milliGRAM(s) IntraMuscular once PRN agitation      Vital Signs Last 24 Hrs  T(C): 37.1 (12 Feb 2020 09:55), Max: 37.2 (12 Feb 2020 08:06)  T(F): 98.7 (12 Feb 2020 09:55), Max: 99 (12 Feb 2020 08:06)  HR: 85 (11 Feb 2020 13:34) (85 - 85)  BP: 137/88 (11 Feb 2020 13:34) (137/88 - 137/88)  BP(mean): --  RR: 18 (11 Feb 2020 13:34) (18 - 18)  SpO2: 97% (11 Feb 2020 13:34) (97% - 97%)  CAPILLARY BLOOD GLUCOSE            PHYSICAL EXAM:  GENERAL: NAD, well-developed  HEAD:  Atraumatic, Normocephalic  EYES: EOMI, conjunctiva and sclera clear  NECK: Supple, No JVD  CHEST/LUNG: Clear to auscultation bilaterally; No wheeze  HEART: Regular rate and rhythm; No murmurs, rubs, or gallops  ABDOMEN: Soft, Nontender, Nondistended; Bowel sounds present  EXTREMITIES:  2+ Peripheral Pulses, No clubbing, cyanosis, or edema  NEUROLOGY: non-focal  SKIN: No rashes or lesions. Tattoos present on left arm   RIGHT SHOULDER - in sling , good range of movement in right shoulder     LABS:                        13.4   7.07  )-----------( 216      ( 11 Feb 2020 03:00 )             39.4     02-11    135  |  98  |  11  ----------------------------<  102<H>  3.9   |  23  |  1.00    Ca    9.2      11 Feb 2020 03:00  Phos  3.8     02-11  Mg     1.5     02-11        CARDIAC MARKERS ( 11 Feb 2020 03:00 )  x     / x     / 640 u/L / x     / x              EKG(personally reviewed): 2/7- NSR with QTC of 456ms     RADIOLOGY & ADDITIONAL TESTS:    Imaging Personally Reviewed CT  shoulder from 2/11-  consistent with prior anterior shoulder dislocation with a large Hill-Sachs deformity and acute appearing fragmented osseous Bankart lesion. No dislocation on the current examination.    Consultant(s) Notes Reviewed:  ortho     Care Discussed with Consultants/Other Providers:

## 2020-02-12 NOTE — CONSULT NOTE ADULT - ASSESSMENT
24M PMHx depression and anxiety presents with suicide attempt via overdose on Venlafaxine and Melatonin, s/p Intubation for airway protection, s/p  MICU s/p intubation  for respiratory support and monitoring. S/p gastric lavage. Pt was extubated on 2./9   # Suicidal attempt/ Intentional drug overdose with high clinical suspicion of seizure  on admission due to Effexor OD given leukocytosis + anterior shoulder dislocation. Prolactin level was 0.06. Continue Management as per Psych   # Rt anterior shoulder dislocation s/p reduction in ED- Post reduction film with minimally displaced Rt glenoid fx noted.CT shoulder from 2/11-  consistent with prior anterior shoulder dislocation with a large Hill-Sachs deformity and acute appearing fragmented osseous Bankart lesion. No dislocation on the current examination.   As per  ortho recommendations, RUE should be kept immobilized in sling , NWB, to  follow up with Dr. Hein in 1 week

## 2020-02-13 PROCEDURE — 99232 SBSQ HOSP IP/OBS MODERATE 35: CPT

## 2020-02-13 PROCEDURE — 90853 GROUP PSYCHOTHERAPY: CPT

## 2020-02-13 RX ORDER — IBUPROFEN 200 MG
600 TABLET ORAL ONCE
Refills: 0 | Status: COMPLETED | OUTPATIENT
Start: 2020-02-13 | End: 2020-02-13

## 2020-02-13 RX ORDER — IBUPROFEN 200 MG
400 TABLET ORAL EVERY 6 HOURS
Refills: 0 | Status: DISCONTINUED | OUTPATIENT
Start: 2020-02-13 | End: 2020-02-24

## 2020-02-13 RX ADMIN — SERTRALINE 25 MILLIGRAM(S): 25 TABLET, FILM COATED ORAL at 08:22

## 2020-02-13 RX ADMIN — Medication 400 MILLIGRAM(S): at 20:15

## 2020-02-13 RX ADMIN — Medication 600 MILLIGRAM(S): at 02:10

## 2020-02-13 RX ADMIN — Medication 400 MILLIGRAM(S): at 21:15

## 2020-02-14 PROCEDURE — 73030 X-RAY EXAM OF SHOULDER: CPT | Mod: 26,RT

## 2020-02-14 PROCEDURE — 99232 SBSQ HOSP IP/OBS MODERATE 35: CPT

## 2020-02-14 RX ORDER — SERTRALINE 25 MG/1
50 TABLET, FILM COATED ORAL DAILY
Refills: 0 | Status: DISCONTINUED | OUTPATIENT
Start: 2020-02-14 | End: 2020-02-20

## 2020-02-14 RX ADMIN — Medication 400 MILLIGRAM(S): at 04:45

## 2020-02-14 RX ADMIN — SERTRALINE 25 MILLIGRAM(S): 25 TABLET, FILM COATED ORAL at 08:27

## 2020-02-14 RX ADMIN — Medication 50 MILLIGRAM(S): at 03:45

## 2020-02-14 RX ADMIN — Medication 400 MILLIGRAM(S): at 03:45

## 2020-02-14 RX ADMIN — Medication 2 MILLIGRAM(S): at 03:45

## 2020-02-14 RX ADMIN — Medication 50 MILLIGRAM(S): at 20:51

## 2020-02-14 RX ADMIN — Medication 400 MILLIGRAM(S): at 11:00

## 2020-02-14 RX ADMIN — HALOPERIDOL DECANOATE 5 MILLIGRAM(S): 100 INJECTION INTRAMUSCULAR at 03:45

## 2020-02-14 RX ADMIN — Medication 400 MILLIGRAM(S): at 20:08

## 2020-02-14 RX ADMIN — Medication 400 MILLIGRAM(S): at 10:20

## 2020-02-14 RX ADMIN — Medication 400 MILLIGRAM(S): at 22:09

## 2020-02-14 NOTE — CHART NOTE - NSCHARTNOTEFT_GEN_A_CORE
Xray Right shoulder- Currently located right glenohumeral joint. Previously seen anterior glenoid fracture suboptimally visualized on the current study. Hill-Sachs deformity again apparent along posterior humeral head margin. No additional fractures. Preserved intact and aligned AC joint. Maintained subacromial and coracoclavicular spaces. No destructive osseous lesions or periosteal reaction. No periarticular soft tissue calcifications.   Xray findings discussed with Ortho PA  Genevieve-  No additional intervention required at this time

## 2020-02-14 NOTE — CHART NOTE - NSCHARTNOTEFT_GEN_A_CORE
Notified by RN, patient became upset after he was unable to fall asleep due to his roommate, in which RN's changed his room. Pt stayed in dayroom and then requested to speak with SW at 3:30AM about leaving [facility] or he is "going to do something I will regret". As RN was contacting provider, pt proceeded to rip off and forcefully throw down his sling to the ground. He reports not hitting his right UE to wall, chair or countertop when he threw sling to ground. Upon arrival to unit patient sitting infront of nursing station wincing in pain without sling on. Pt requests to be put under anesthesia for the pain he is feeling. Upon examination, no deformities, bulges, or asymmetry from left shoulder noted. Motor and Sensory intact b/l. 2+ Radial Pulses intact bilaterally. No ecchymosis or bruising noted. Exam limited d/t pain, pt not willing to extend elbow, but active and passive ROM intact of wrist and fingers after some time. Sling put back in place and patient went back into room to laydown.     Spoke with Ortho (@[id number]) and discussed case---recommended to obtain AP, Lateral and Axillary Xray of Right shoulder. If concern for shoulder re-dislocation to call ortho back for another reduction--will have to be transferred to ED.    - Please follow-up following Xrays of Right Shoulder and update Ortho as appropriate.      - 2-view       - Transcapular     - Axillary    - Motrin was given to patient, will offer Tylenol for pain instead moving forward  - Cold Compress to be applied to site for pain relief as well  -Will continue to monitor patient overnight    Bianka Gomez PA  Medicine [id number]

## 2020-02-15 PROCEDURE — 99232 SBSQ HOSP IP/OBS MODERATE 35: CPT

## 2020-02-15 RX ORDER — LANOLIN ALCOHOL/MO/W.PET/CERES
3 CREAM (GRAM) TOPICAL ONCE
Refills: 0 | Status: COMPLETED | OUTPATIENT
Start: 2020-02-15 | End: 2020-02-15

## 2020-02-15 RX ADMIN — Medication 25 MILLIGRAM(S): at 23:35

## 2020-02-15 RX ADMIN — Medication 50 MILLIGRAM(S): at 21:17

## 2020-02-15 RX ADMIN — SERTRALINE 50 MILLIGRAM(S): 25 TABLET, FILM COATED ORAL at 10:23

## 2020-02-15 RX ADMIN — Medication 400 MILLIGRAM(S): at 17:02

## 2020-02-15 RX ADMIN — Medication 400 MILLIGRAM(S): at 16:24

## 2020-02-15 RX ADMIN — Medication 3 MILLIGRAM(S): at 23:49

## 2020-02-16 PROCEDURE — 99231 SBSQ HOSP IP/OBS SF/LOW 25: CPT

## 2020-02-16 RX ADMIN — Medication 50 MILLIGRAM(S): at 21:10

## 2020-02-16 RX ADMIN — SERTRALINE 50 MILLIGRAM(S): 25 TABLET, FILM COATED ORAL at 09:02

## 2020-02-16 RX ADMIN — Medication 2 MILLIGRAM(S): at 22:00

## 2020-02-17 PROCEDURE — 99231 SBSQ HOSP IP/OBS SF/LOW 25: CPT

## 2020-02-17 RX ADMIN — SERTRALINE 50 MILLIGRAM(S): 25 TABLET, FILM COATED ORAL at 09:07

## 2020-02-17 RX ADMIN — Medication 50 MILLIGRAM(S): at 21:05

## 2020-02-17 RX ADMIN — Medication 2 MILLIGRAM(S): at 21:05

## 2020-02-18 PROCEDURE — 99232 SBSQ HOSP IP/OBS MODERATE 35: CPT

## 2020-02-18 RX ORDER — HYDROXYZINE HCL 10 MG
50 TABLET ORAL AT BEDTIME
Refills: 0 | Status: DISCONTINUED | OUTPATIENT
Start: 2020-02-18 | End: 2020-02-24

## 2020-02-18 RX ADMIN — Medication 50 MILLIGRAM(S): at 20:26

## 2020-02-18 RX ADMIN — SERTRALINE 50 MILLIGRAM(S): 25 TABLET, FILM COATED ORAL at 08:49

## 2020-02-19 PROCEDURE — 99232 SBSQ HOSP IP/OBS MODERATE 35: CPT

## 2020-02-19 PROCEDURE — 90853 GROUP PSYCHOTHERAPY: CPT

## 2020-02-19 RX ADMIN — Medication 50 MILLIGRAM(S): at 21:02

## 2020-02-19 RX ADMIN — SERTRALINE 50 MILLIGRAM(S): 25 TABLET, FILM COATED ORAL at 08:08

## 2020-02-20 PROCEDURE — 99232 SBSQ HOSP IP/OBS MODERATE 35: CPT

## 2020-02-20 RX ORDER — SERTRALINE 25 MG/1
75 TABLET, FILM COATED ORAL DAILY
Refills: 0 | Status: DISCONTINUED | OUTPATIENT
Start: 2020-02-20 | End: 2020-02-24

## 2020-02-20 RX ADMIN — Medication 50 MILLIGRAM(S): at 20:47

## 2020-02-20 RX ADMIN — SERTRALINE 50 MILLIGRAM(S): 25 TABLET, FILM COATED ORAL at 09:07

## 2020-02-20 RX ADMIN — Medication 600 MILLIGRAM(S): at 07:52

## 2020-02-21 PROCEDURE — 73030 X-RAY EXAM OF SHOULDER: CPT | Mod: 26,RT

## 2020-02-21 PROCEDURE — 99232 SBSQ HOSP IP/OBS MODERATE 35: CPT

## 2020-02-21 RX ORDER — SERTRALINE 25 MG/1
3 TABLET, FILM COATED ORAL
Qty: 21 | Refills: 0
Start: 2020-02-21 | End: 2020-02-27

## 2020-02-21 RX ADMIN — Medication 50 MILLIGRAM(S): at 20:56

## 2020-02-21 RX ADMIN — Medication 50 MILLIGRAM(S): at 20:55

## 2020-02-21 RX ADMIN — SERTRALINE 75 MILLIGRAM(S): 25 TABLET, FILM COATED ORAL at 08:27

## 2020-02-21 RX ADMIN — Medication 400 MILLIGRAM(S): at 20:55

## 2020-02-21 NOTE — CONSULT NOTE ADULT - SUBJECTIVE AND OBJECTIVE BOX
Orthopedic Surgery Consult Note    HPI:   24yMale s/p shoulder dislocation and reduction by ED on 2/7. Pt presented late on 2/7 after suicide attempt and ingestion of medication. Per ED presumed seizure while unconscious presented with Right shoulder dislocation and pain. Patient denies numbness or tingling in the RUE. Patient denies any other injuries.  Pt reports that he has been compliant with sling since discharge from hospital.  Currently denies any shoulder pain, is requesting to come out of the sling and advance activity.  He scheduled for discharge home on Monday, scheduled to see Dr Hein in the office on Wednesday.    Review of systems: As per HPI, otherwise negative.     PAST MEDICAL & SURGICAL HISTORY:  Depression, unspecified depression type  Anxiety  No pertinent past medical history  No significant past surgical history    Family History: FAMILY HISTORY:  No pertinent family history in first degree relatives    Medications: MEDICATIONS  (STANDING):  hydrOXYzine hydrochloride 50 milliGRAM(s) Oral at bedtime  sertraline 75 milliGRAM(s) Oral daily    MEDICATIONS  (PRN):  diphenhydrAMINE 50 milliGRAM(s) Oral every 6 hours PRN agitation/ insomnia  diphenhydrAMINE   Injectable 50 milliGRAM(s) IntraMuscular once PRN agitation  haloperidol     Tablet 5 milliGRAM(s) Oral every 6 hours PRN agitation  haloperidol    Injectable 5 milliGRAM(s) IntraMuscular once PRN agitation  hydrOXYzine hydrochloride 25 milliGRAM(s) Oral every 8 hours PRN anxiety  ibuprofen  Tablet. 400 milliGRAM(s) Oral every 6 hours PRN Moderate Pain (4 - 6)  LORazepam     Tablet 1 milliGRAM(s) Oral every 6 hours PRN agitation  LORazepam   Injectable 2 milliGRAM(s) IntraMuscular once PRN agitation      EXAM:  Gen: NAD, alert and oriented  Resp: no increased WOB on RA  RUE:   Arm in sling  Skin intact, no ecchymosis/erythema, non-TTP diffusely  No gross anterior/posterior instability of shoulder  Full active and passive ROM of elbow/wrist, no pain or stiffness  SILT M/U/R/Ax  M/R/U/AIN/PIN motor intact  2+ radial pulse      Imaging:    XR R shoulder 2/21 demonstrates well-located glenohumeral joint

## 2020-02-21 NOTE — CONSULT NOTE ADULT - ASSESSMENT
24y Male s/p reduction of right shoulder dislocation by ED on 2/7.      pain control  NWB RUE in sling  ROM of fingers wrist and elbow  no ROM of shoulder  No acute orthopaedic intervention  follow up with Dr. Hein at scheduled visit to discuss treatment options and activity progression

## 2020-02-22 RX ADMIN — Medication 50 MILLIGRAM(S): at 20:46

## 2020-02-22 RX ADMIN — SERTRALINE 75 MILLIGRAM(S): 25 TABLET, FILM COATED ORAL at 08:34

## 2020-02-23 RX ADMIN — SERTRALINE 75 MILLIGRAM(S): 25 TABLET, FILM COATED ORAL at 09:07

## 2020-02-23 RX ADMIN — Medication 50 MILLIGRAM(S): at 20:35

## 2020-02-24 ENCOUNTER — OUTPATIENT (OUTPATIENT)
Dept: OUTPATIENT SERVICES | Facility: HOSPITAL | Age: 25
LOS: 1 days | Discharge: LEFT BEFORE DISCHARGE | End: 2020-02-24

## 2020-02-24 ENCOUNTER — EMERGENCY (EMERGENCY)
Facility: HOSPITAL | Age: 25
LOS: 1 days | Discharge: HOME | End: 2020-02-24
Admitting: EMERGENCY MEDICINE
Payer: COMMERCIAL

## 2020-02-24 VITALS
SYSTOLIC BLOOD PRESSURE: 155 MMHG | DIASTOLIC BLOOD PRESSURE: 114 MMHG | TEMPERATURE: 98 F | RESPIRATION RATE: 16 BRPM | OXYGEN SATURATION: 100 % | HEART RATE: 88 BPM

## 2020-02-24 VITALS
OXYGEN SATURATION: 100 % | HEART RATE: 72 BPM | SYSTOLIC BLOOD PRESSURE: 137 MMHG | DIASTOLIC BLOOD PRESSURE: 81 MMHG | RESPIRATION RATE: 16 BRPM

## 2020-02-24 VITALS — TEMPERATURE: 98 F

## 2020-02-24 PROCEDURE — 90792 PSYCH DIAG EVAL W/MED SRVCS: CPT

## 2020-02-24 PROCEDURE — 99283 EMERGENCY DEPT VISIT LOW MDM: CPT

## 2020-02-24 PROCEDURE — 99238 HOSP IP/OBS DSCHRG MGMT 30/<: CPT

## 2020-02-24 RX ADMIN — Medication 400 MILLIGRAM(S): at 14:26

## 2020-02-24 RX ADMIN — SERTRALINE 75 MILLIGRAM(S): 25 TABLET, FILM COATED ORAL at 08:11

## 2020-02-24 NOTE — ED BEHAVIORAL HEALTH ASSESSMENT NOTE - SAFETY PLAN DETAILS
call 911 or come back to ER if should have SI or HI or feel unsafe in anyway. call 911 or come back to ER if should have SI or HI or feel unsafe in anyway.  Safety plan completed with noted warning signs, coping strategies, distractions, people/professionals to call for help with patient engaged in completion of safety plan with this physician.

## 2020-02-24 NOTE — ED BEHAVIORAL HEALTH NOTE - BEHAVIORAL HEALTH NOTE
Writer called pt’s parents Mukesh [phone] left a voicemail requesting a callback.  Writer called [phone] and spoke to pt’s father Nando.  Pt’s father states [facility] was mixed up because they thought patient was discharged on Friday.  He states patient was told in [facility] he would be there for 6 weeks, but didn’t want to commit to that so he decided not to follow up with that.  Patient requested an appointment with Dr. Smith.  Writer spoke to Social work Jasmyne Shook at [facility] who is arranging outpatient treatment and will call patient at home with an appointment when one is available.  He states he is in agreement with patient going to outpatient treatment at [facility] with Dr. Smith.  He states he wants patient to receive an immediate appointment as the last time he was discharged it took two weeks for an appointment to be scheduled.  Pt’s father will transport patient home and will take patient to [facility] to retrieve belongings still on the unit.

## 2020-02-24 NOTE — ED BEHAVIORAL HEALTH ASSESSMENT NOTE - DETAILS
Low 3 discharge today Researched methods of suicide extensively but never acted on them Alcoholism As above d/w NP Vuong on Low 3 d/w NP Consuelo Vuong NP suicide attempt on 2/9/2020 of overdose on Venlafaxine and Melatonin that lead to [facility] admit on 2/11/20 suicide attempt on 2/7/2020 of overdose on Venlafaxine and Melatonin that lead to [facility] admit on 2/11/20 Low 3 discharge today after admission from 2/11 - 2/24/2020. right shoulder injury

## 2020-02-24 NOTE — ED BEHAVIORAL HEALTH ASSESSMENT NOTE - OTHER
[facility] Does not believe CBT is helping him [id number] discharged today from [place] [address], completed intake at [facility] but declined but agrees to alternative referral. psychiatric provider from inpatient unit

## 2020-02-24 NOTE — ED BEHAVIORAL HEALTH ASSESSMENT NOTE - REFERRAL / APPOINTMENT DETAILS
f/u with [facility]EL with appt pending, given [facility] brochure f/u with [facility] ARNEL with appt scheduled with Dr. Smith tomorrow on 2/25/20 at 9:30am, also given [facility] brochure available for walk-ins f/u with [facility]IA with appt scheduled with Dr. Smith tomorrow on 2/25/20 at 9:30am, also given [facility] brochure available for walk-ins Monday-Friday 9am-7pm.

## 2020-02-24 NOTE — ED BEHAVIORAL HEALTH ASSESSMENT NOTE - SUICIDE PROTECTIVE FACTORS
Engaged in work or school/Supportive social network of family or friends Responsibility to family and others/Supportive social network of family or friends/Engaged in work or school/Identifies reasons for living Supportive social network of family or friends/Positive therapeutic relationships/Responsibility to family and others/Identifies reasons for living/Engaged in work or school

## 2020-02-24 NOTE — ED PROVIDER NOTE - PATIENT PORTAL LINK FT
You can access the FollowMyHealth Patient Portal offered by [facility] by registering at the following website: [email or web address]. By joining [facility]’s FollowMyHealth portal, you will also be able to view your health information using other applications (apps) compatible with our system.

## 2020-02-24 NOTE — ED BEHAVIORAL HEALTH ASSESSMENT NOTE - ADDITIONAL DETAILS ALL
Daily passive suicidal ideation, no current SI no current SI no current SI, no intent, no plan, history of past SIB and one past SA as stated above no current SI, no intent, no plan, history of past SIB and one past SA as stated above, past self harm via slapping self in head

## 2020-02-24 NOTE — ED BEHAVIORAL HEALTH ASSESSMENT NOTE - ADDITIONAL DETAILS / COMMENTS
Patient is aware he has psychiatric illness and wants treatment Patient is aware he has psychiatric illness and wants outpatient treatment

## 2020-02-24 NOTE — ED PROVIDER NOTE - NSFOLLOWUPCLINICS_GEN_ALL_ED_FT
[facility]  Behavioral Health  [address] [place] [address]  Phone: [phone]  Fax:   Follow Up Time:

## 2020-02-24 NOTE — ED BEHAVIORAL HEALTH ASSESSMENT NOTE - REASON FOR REFERRAL
suicidality safety related to refusal to attend [facility] PHP safety related to refusal to attend [facility] PHP after inpatient discharg safety related to refusal to attend [facility] PHP after inpatient discharge

## 2020-02-24 NOTE — ED BEHAVIORAL HEALTH ASSESSMENT NOTE - NS ED BHA PLAN TR BH CONTACTED FT
d/w Dr. Carlisle and NP Yevgeniy d/w Dr. Carlisle and NP Consuelo morelos signed out to Dr. Smith with appt made for tomorrow.

## 2020-02-24 NOTE — ED PROVIDER NOTE - CLINICAL SUMMARY MEDICAL DECISION MAKING FREE TEXT BOX
This is a 24 yr old M, pmh anxiety depression, si and attempt. Pt sent in from [facility] after being discharge today from [facility]. Pt states he went to the program today after his discharge.   Psych consult requested. This is a 24 yr old M, pmh anxiety depression, si and attempt. Pt sent in from [facility] partial program after being discharge today from [facility]. Pt states he went to the partial program today after his discharge from [facility].   Psych consult requested.-their recommendation out patient follow up.  There is no clinical evidence of intoxication, or any acute medical problem requiring immediate intervention.

## 2020-02-24 NOTE — ED BEHAVIORAL HEALTH ASSESSMENT NOTE - DESCRIPTION (FIRST USE, LAST USE, QUANTITY, FREQUENCY, DURATION)
Vape pen, semi-frequently Binges, daily smoking for 2-3 weeks every few months hx of using a Vape pen Hx of past use with documented binges, no use x over 2 weeks

## 2020-02-24 NOTE — ED BEHAVIORAL HEALTH ASSESSMENT NOTE - SUMMARY
Psychiatrically cleared for discharge, full note in progress.    This is a 25 yo M with no PMH, no past psychiatric hospitalizations or diagnoses, significant past history of suicidal ideation and depression, presenting to ER for suicidality. Pt woke up this morning and was driving to work when he began feeling anger for having a mental illness and returned home from work. He was having strong suicidal ideation with plan but no intent, thinking about using a helium tank to suffocate himself or jump off a bridge. He then remained at home for a few hours and decided to come to the ED because he believed he would eventually kill himself if he did not seek help. Psychiatry consulted for suicidality and depression.    On evaluation, patient reported feeling in good health until 5th grade, during which his teacher bullied and mocked him. He soon lost interest in school and began to spend more time playing video games and watching TV. However, his academic performance did not suffer and he did well in school with minimal effort. During this time, he began to experience depressed mood every day for most of the day, significant anhedonia, sleep disturbance, guilt, concentration impairment and daily passive suicidal ideation with no intent but plans including using a helium tank and jumping off a bridge. He also harmed himself often by slapping himself in the head due to feeling a need to be punished. He also endorsed anxiety at this time. He also began to consume marijuana in binges, smoking daily for several weeks at a time with months in between binges. He acknowledges he abuses THC but does not intend to completely stop, although he states he can decrease his frequency of use. He was on an SSRI medication for about a year but stopped taking it in January 2018. Approximately a year later, he experienced a manic episode- 7 days of sleeplessness, increased energy, pressured speech, grandiosity. He notes some improvement in depressive symptoms from the SSRI but felt it made him listless and robotic. He has been on Effexor for the last year and notes similar response, preferring not to take medication. He has been attending CBT psychotherapy weekly for the last 8 months but finds it ineffective. His depression had some mild improvement during college since he had work to occupy his time and goals to accomplish, but depression became more severe after he graduated. He endorses vague possible visual hallucination but notes it may be due to his frequent marijuana use. Patient believes he will kill himself if he does not seek psychiatric treatment. There is family psychiatric history of alcoholism. Denies current SI/HI. This is a 23 yo single  male, residing with family in [place], employed at [employer], past psychiatric history of Major Depressive Disorder, two past psychiatric hospitalizations, just discharged today from [place] [address] after hospitalized from 2/11-2/24/2020, past suicide attempt via Venlafaxine and Melatonin overdose, with past medical history of dislocated right shoulder presenting to ER after patient declined discharge plan of [facility] after intake completed today.  During inpatient stay patient was started on Zoloft titrated to 75mg qaM with reported adherance and well-tolerated.  On exam patient is calm, cooperative and pleasant and denies depressed mood at this time, reports mood as "better" at this time with reported improvement during inpatient course.  Patient denies SI/I/P or HI.  Denies delusions/AVH. Patient was earlier seen today by Dr. Carlisle as patient was presenting to [facility] after inpatient stay s/p suicide attempt by overdose on 2/7/2020, and case is discussed with ER presentation consistent with earlier findings.  Although patient has a chronic elevated background risk, there is no evidence of an elevated acute risk of harm to self at this time.  On evaluation, patient reported the realization that he wants a future, hopes to eventually have a family and plans to reengage in treatment with Dr. Smith as an outpatient.  Patient reports being remorseful that he attempted to suicide and reports it was a "wake up call."  Patient states he plans to return to work next week and hopes for a promotion, therefore declines to attend [facility].  Patient is offered voluntary admission and declines and does not warrant involuntary admission at this time. No access to guns or weapons, aware of safety plan completed with patient. This is a 23 yo single  male, residing with family in [place], employed at [employer], past psychiatric history of Major Depressive Disorder, two past psychiatric hospitalizations, just discharged today from [place] [address] after hospitalized from 2/11-2/24/2020, past suicide attempt via Venlafaxine and Melatonin overdose, with past medical history of dislocated right shoulder presenting to ER after patient declined discharge plan of [facility] after intake completed today.  During inpatient stay patient was started on Zoloft titrated to 75mg qaM with reported adherance and well-tolerated.  On exam patient is calm, cooperative and pleasant and denies depressed mood at this time, reports mood as "better" at this time with reported improvement during inpatient course.  Patient denies SI/I/P or HI.  Denies delusions/AVH. Patient was earlier seen today by Dr. Carlisle as patient was presenting to [facility] after inpatient stay s/p suicide attempt by overdose on 2/7/2020, and case is discussed with ER presentation consistent with earlier findings.  Although patient has a chronic elevated background risk, there is no evidence of an elevated acute risk of harm to self at this time.  On evaluation, patient reported the realization that he wants a future, hopes to eventually have a family and plans to reengage in treatment with Dr. Smith as an outpatient.  Patient reports being remorseful that he attempted to suicide and reports it was a "wake up call."  Patient states he plans to return to work next week and hopes for a promotion.  Patient is offered voluntary admission and declines and does not warrant involuntary admission at this time. No access to guns or weapons, aware of safety plan completed with patient.  Patient does not present as an acute risk to self or others at this time.

## 2020-02-24 NOTE — ED BEHAVIORAL HEALTH ASSESSMENT NOTE - RISK ASSESSMENT
Patient denies current SI so has low acute risk but chronic risk elevated due to significant history of suicidal ideation with no intent but plan Low Acute Suicide Risk Patient denies current SI so has low acute risk but chronic risk elevated due to significant past history of suicidal ideation with past suicide attempt Patient denies current SI so has low acute risk but chronic risk elevated due to significant past history of suicidal ideation with past suicide attempt.  Risk factors include history of major depressive disorder with precipitating factors of discharge from inpatient psychiatric unit earlier today and decline of starting of PHP program.  Protective factors include future oriented thinking, focus on job, supportive family with positive therapeutic relationship expressed and motivated for outpatient follow up at [facility].

## 2020-02-24 NOTE — ED ADULT TRIAGE NOTE - CHIEF COMPLAINT QUOTE
pt here for psychiatric reevaluation, pt was discharged from [facility] low 3 this morning, pt was sent to [facility] which does not seem to be a good fit. pt sent back to the ED for a psychiatric reevaluation. denies any suicidal or homicidal ideations. denies any drug or alcohol use, no auditory or visual hallucinations

## 2020-02-24 NOTE — ED BEHAVIORAL HEALTH ASSESSMENT NOTE - SUICIDE RISK FACTORS
Hopelessness or despair/Alcohol/Substance abuse disorders/Current mood episode/Anhedonia/Mood Disorder current/past Mood Disorder current/past

## 2020-02-24 NOTE — ED PROVIDER NOTE - NSFOLLOWUPINSTRUCTIONS_ED_ALL_ED_FT
PLEASE KEEP YOUR FOLLOW UP APPOINTMENT WITH DR CUNNINGHAM AT [facility] WITH [facility] AT 9 AM TOMORROW 2/25/2020.

## 2020-02-24 NOTE — ED PROVIDER NOTE - NS ED ROS FT
ROS  	•	CONSTITUTIONAL - no fever, no diaphoresis, no weight change  	•	SKIN - no rash  	•	HEMATOLOGIC - no bleeding, no bruising  	•	EYES - no eye pain, no blurred vision  	•	ENT - no change in hearing, no pain  	•	RESPIRATORY - no shortness of breath, no cough  	•	CARDIAC - no chest pain, no palpitations  	•	GI - no abd pain, no nausea, no vomiting, no diarrhea, no constipation, no bleeding  	•	GENITO-URINARY - no frequent urination, no urgency, no dysuria; no hematuria,    	•	ENDO - no polydypsia, no polyurea, no heat/no cold intolerance  	•	MUSCULOSKELETAL - no joint paint, no swelling, no redness  	•	NEUROLOGIC - no weakness, no headache, no anesthesia, no paresthesias  	•	PSYCH - + anxiety, no depression, no suicidal, no homicidal, no hallucination,

## 2020-02-24 NOTE — ED BEHAVIORAL HEALTH ASSESSMENT NOTE - COMMENTS ON VIOLENCE RISK/PROTECTIVE FACTORS:
Patient is at low risk of violence, never aggressive to tohers Patient is at low risk of violence, never aggressive to others

## 2020-02-24 NOTE — ED ADULT NURSE NOTE - NSIMPLEMENTINTERV_GEN_ALL_ED
Implemented All Universal Safety Interventions:  [place] to call system. Call bell, personal items and telephone within reach. Instruct patient to call for assistance. Room bathroom lighting operational. Non-slip footwear when patient is off stretcher. Physically safe environment: no spills, clutter or unnecessary equipment. Stretcher in lowest position, wheels locked, appropriate side rails in place.

## 2020-02-24 NOTE — ED ADULT NURSE NOTE - CHIEF COMPLAINT QUOTE
pt here for psychiatric reevaluation, pt was discharged from [facility] low 3 this morning, pt was sent to [facility] which does not seem to be a good fit. pt sent back to the ED for a psychiatric reevaluation. denies any suicidal or homicidal ideations. denies any drug or alcohol use, no auditory or visual hallucinations

## 2020-02-24 NOTE — ED BEHAVIORAL HEALTH ASSESSMENT NOTE - OTHER PAST PSYCHIATRIC HISTORY (INCLUDE DETAILS REGARDING ONSET, COURSE OF ILLNESS, INPATIENT/OUTPATIENT TREATMENT)
Long history of depression and some anxiety since 5th grade, one manic episode in January 2019, substance use THC, self-harm via striking, near daily suicidal ideation with plan but no intent Long history of depression and some anxiety since 5th grade, one manic episode reported in January 2019, substance use THC, self-harm via striking.  One past suicide attempt after ingestion of 15 tablets of Venlafaxine 150mg and 15 tablets of Melatonin 3mg on 2/9/2020.  2 prior psychiatric hospitalizations as was first at [address] [place] [address] from 1/16-1/21/20 and [facility] from 2/11-2/24/20.  Prior intake at [facility] with Dr. Smith on 1/27/2020. Long history of depression and some anxiety since 5th grade, one manic episode reported in January 2019, substance use THC, self-harm via striking.  One past suicide attempt after ingestion of 15 tablets of Venlafaxine 150mg and 15 tablets of Melatonin 3mg on 2/7/2020.  2 prior psychiatric hospitalizations as was first at [address] [place] [address] from 1/16-1/21/20 and [facility] from 2/11-2/24/20.  Prior intake at [facility] with Dr. Smith on 1/27/2020. Long history of depression and some anxiety since 5th grade, He had previously been on Effexor and had been attending CBT psychotherapy weekly in past. His depression had some mild improvement during college, but depression became more severe after he graduated.  One past suicide attempt after ingestion of 15 tablets of Venlafaxine 150mg and 15 tablets of Melatonin 3mg on 2/7/2020.  2 prior psychiatric hospitalizations as was first at [address] [place] [address] from 1/16-1/21/20 and [facility] from 2/11-2/24/20.  Prior intake at [facility] with Dr. Smith on 1/27/2020.

## 2020-02-24 NOTE — ED BEHAVIORAL HEALTH ASSESSMENT NOTE - DESCRIPTION
No interventions, patient vitals recorded. None Patient spends most of his time alone at home in his room, lost all interest in hobbies including video games calm, cooperative, pleasant, no agitation, in good behavioral control, no prns required    Vital Signs Last 24 Hrs  T(C): 36.9 (24 Feb 2020 13:05), Max: 36.9 (24 Feb 2020 13:05)  T(F): 98.4 (24 Feb 2020 13:05), Max: 98.4 (24 Feb 2020 13:05)  HR: 72 (24 Feb 2020 14:30) (72 - 88)  BP: 137/81 (24 Feb 2020 14:30) (137/81 - 155/114)  BP(mean): --  RR: 16 (24 Feb 2020 14:30) (16 - 16)  SpO2: 100% (24 Feb 2020 14:30) (100% - 100%) Resides in house in [place] with family, employed full time at [employer] as a  and plans to return to work on Monday, March 2nd.  No children, never .

## 2020-02-24 NOTE — ED BEHAVIORAL HEALTH ASSESSMENT NOTE - SAFETY PLAN ADDT'L DETAILS
Education provided regarding environmental safety / lethal means restriction/Provision of National Suicide Prevention Lifeline 8-284-542-IDLT (9224)/Safety plan discussed with...

## 2020-02-24 NOTE — ED BEHAVIORAL HEALTH ASSESSMENT NOTE - HPI (INCLUDE ILLNESS QUALITY, SEVERITY, DURATION, TIMING, CONTEXT, MODIFYING FACTORS, ASSOCIATED SIGNS AND SYMPTOMS)
Psychiatrically cleared for discharge, full note in progress.    This is a 23 yo CM patient with no PMH, no past psychiatric hospitalizations or diagnoses, significant past history of suicidal ideation and depression, presenting to ER for suicidality. Pt woke up this morning and was driving to work when he began feeling anger for having a mental illness and returned home from work. He was having strong suicidal ideation with plan but no intent, thinking about using a helium tank to suffocate himself or jump off a bridge. He then remained at home for a few hours and decided to come to the ED because he believed he would eventually kill himself if he did not seek help. Psychiatry consulted for suicidality and depression.    On evaluation, patient reported feeling in good health until 5th grade, during which his teacher bullied and mocked him. He soon lost interest in school and began to spend more time playing video games and watching TV. However, his academic performance did not suffer and he did well in school with minimal effort. During this time, he began to experience depressed mood every day for most of the day, significant anhedonia, sleep disturbance, guilt, concentration impairment and daily passive suicidal ideation with no intent but plans including using a helium tank and jumping off a bridge. He also harmed himself often by slapping himself in the head due to feeling a need to be punished. He also endorsed anxiety at this time. He also began to consume marijuana in binges, smoking daily for several weeks at a time with months in between binges. He acknowledges he abuses THC but does not intend to completely stop, although he states he can decrease his frequency of use. He was on an SSRI medication for about a year but stopped taking it in January 2018. Approximately a year later, he experienced a manic episode- 7 days of sleeplessness, increased energy, pressured speech, grandiosity. He notes some improvement in depressive symptoms from the SSRI but felt it made him listless and robotic. He has been on Effexor for the last year and notes similar response, preferring not to take medication. He has been attending CBT psychotherapy weekly for the last 8 months but finds it ineffective. His depression had some mild improvement during college since he had work to occupy his time and goals to accomplish, but depression became more severe after he graduated. He endorses vague possible visual hallucination but notes it may be due to his frequent marijuana use. Patient believes he will kill himself if he does not seek psychiatric treatment. There is family psychiatric history of alcoholism. Denies current SI/HI.    Collateral from father Nando Echevarria () was in accord with patient's report. They stated he was brought to the ER 1 year ago for anger and property destruction at home after 2 of his friends cut off communication with him. He was advised to follow up with [facility] outpatient but did not do so. Psychiatrically cleared for discharge, full note in progress.    This is a 25 yo single  male, residing with family in [place], employed at [employer], past psychiatric history of Major Depressive Disorder, two past psychiatric hospitalizations, just discharged today from [facility] after hospitalized from 2/11-2/24/2020, prior admission on  [facility] 1/15-1/21/2020, significant past history of suicidal ideation and depression with past suicide attempt via Venlafaxine and Melatonin overdose which resulted in intubation, medical hospitalization prior to [facility] 2/11/20 hospitalization, with past medical history of dislocated right shoulder presenting to ER for evaluation to assess suicide risk after patient declined engagement in discharge plan of [facility] after intake completed today.        Pt woke up this morning and was driving to work when he began feeling anger for having a mental illness and returned home from work. He was having strong suicidal ideation with plan but no intent, thinking about using a helium tank to suffocate himself or jump off a bridge. He then remained at home for a few hours and decided to come to the ED because he believed he would eventually kill himself if he did not seek help. Psychiatry consulted for suicidality and depression.    On evaluation, patient reported feeling in good health until 5th grade, during which his teacher bullied and mocked him. He soon lost interest in school and began to spend more time playing video games and watching TV. However, his academic performance did not suffer and he did well in school with minimal effort. During this time, he began to experience depressed mood every day for most of the day, significant anhedonia, sleep disturbance, guilt, concentration impairment and daily passive suicidal ideation with no intent but plans including using a helium tank and jumping off a bridge. He also harmed himself often by slapping himself in the head due to feeling a need to be punished. He also endorsed anxiety at this time. He also began to consume marijuana in binges, smoking daily for several weeks at a time with months in between binges. He acknowledges he abuses THC but does not intend to completely stop, although he states he can decrease his frequency of use. He was on an SSRI medication for about a year but stopped taking it in January 2018. Approximately a year later, he experienced a manic episode- 7 days of sleeplessness, increased energy, pressured speech, grandiosity. He notes some improvement in depressive symptoms from the SSRI but felt it made him listless and robotic. He has been on Effexor for the last year and notes similar response, preferring not to take medication. He has been attending CBT psychotherapy weekly for the last 8 months but finds it ineffective. His depression had some mild improvement during college since he had work to occupy his time and goals to accomplish, but depression became more severe after he graduated. He endorses vague possible visual hallucination but notes it may be due to his frequent marijuana use. Patient believes he will kill himself if he does not seek psychiatric treatment. There is family psychiatric history of alcoholism. Denies current SI/HI.    Collateral from father Nando Echevarrai () was in accord with patient's report. They stated he was brought to the ER 1 year ago for anger and property destruction at home after 2 of his friends cut off communication with him. He was advised to follow up with [facility] outpatient but did not do so. Psychiatrically cleared for discharge, full note in progress.    This is a 25 yo single  male, residing with family in [place], employed at [employer], past psychiatric history of Major Depressive Disorder, two past psychiatric hospitalizations, just discharged today from [facility] after hospitalized from 2/11-2/24/2020, prior admission on [address] [place] [address] 1/15-1/21/2020, significant past history of suicidal ideation and depression with past suicide attempt via Venlafaxine and Melatonin overdose which resulted in intubation, medical hospitalization prior to [facility] 2/11/20 hospitalization, with past medical history of dislocated right shoulder presenting to ER for evaluation to assess suicide risk after patient declined engagement in discharge plan of [facility] after intake completed today.  Of note on first psychiatric hospitalization in 1/2020 patient was reportedly having suicidal ideation with plan but no intent, thinking about using a helium tank to suffocate himself or jump off a bridge.  Patient at that time decided to come to the [facility] ED and was admitted to [address] [place] [address].       On evaluation, patient reported the realization that he wants a future, hopes to eventually have a family and plans to reengage in treatment with Dr. Smith as an outpatient.  Patient reports being remorseful that he attempted to suicide and reports it was a "wake up call."  Patient states he plans to return to work next week and hopes for a promotion, therefore declines to attend PHP.      Pt has a past history of depressed mood, significant anhedonia, sleep disturbance, guilt, concentration impairment and daily passive suicidal ideation with no intent but plans including using a helium tank and jumping off a bridge.  Reported history of SIB by slapping himself in the head due to feeling a need to be punished.  History of past use of marijuana in binges, smoking daily for several weeks at a time with months in between binges. He acknowledges history of abuse of THC.  He notes some improvement in depressive symptoms from the SSRI and inpatient treatment.  He had previously been on Effexor and had been attending CBT psychotherapy weekly in past. His depression had some mild improvement during college since he had work to occupy his time and goals to accomplish, but depression became more severe after he graduated. There is family psychiatric history of alcoholism. Denies current SI/HI.    Collateral from father reveals no acute safety concerns. Psychiatrically cleared for discharge, full note in progress.    This is a 23 yo single  male, residing with family in [place], employed at [employer], past psychiatric history of Major Depressive Disorder, two past psychiatric hospitalizations, just discharged today from [facility] after hospitalized from 2/11-2/24/2020, prior admission on [address] [place] [address] 1/15-1/21/2020, significant past history of suicidal ideation and depression with past suicide attempt via Venlafaxine and Melatonin overdose which resulted in intubation, medical hospitalization prior to [facility] 2/11/20 hospitalization, with past medical history of dislocated right shoulder presenting to ER for evaluation to assess suicide risk after patient declined engagement in discharge plan of [facility] after intake completed today.  During inpatient stay patient was started on Zoloft titrated to 75mg qaM with reported adherance and well-tolerated. Inpatient medical records are reviewed and patient was on a 1:1 CO until 2/19 (because had a sling and hx of SIB, punching walls), but there were not episodes of SIB since coming off of C.O.  On exam patient is calm, cooperative and pleasant and denies depressed mood at this time, reports mood as "better" at this time with reported improvement during inpatient course.  Patient denies SI/I/P or HI. Denies delusions/AVH. Patient was earlier seen today by Dr. Carlisle as patient was presenting to [facility] after inpatient stay s/p suicide attempt by overdose, and sx of depression and maladaptive coping style and personality pathology with assessment reviewed and case is discussed with ER presentation consistent with earlier findings.  Although patient has a chronic elevated background risk, there is no evidence of an elevated acute risk of harm to self at this time.  Patient is offered voluntary admission and declines and does not warrant involuntary admission at this time. No access to guns or weapons, aware of safety plan completed with patient.     On evaluation, patient reported the realization that he wants a future, hopes to eventually have a family and plans to reengage in treatment with Dr. Smiht as an outpatient.  Patient reports being remorseful that he attempted to suicide and reports it was a "wake up call."  Patient states he plans to return to work next week and hopes for a promotion, therefore declines to attend PHP.      Of note on first psychiatric hospitalization in 1/2020 patient was reportedly having suicidal ideation with plan but no intent, thinking about using a helium tank to suffocate himself or jump off a bridge.  Patient at that time decided to come to the [facility] ED and was admitted to [address] [place] [address].       Pt has a past history of depressed mood, significant anhedonia, sleep disturbance, guilt, concentration impairment and daily passive suicidal ideation with no intent but plans including using a helium tank and jumping off a bridge.  Reported history of SIB by slapping himself in the head due to feeling a need to be punished.  History of past use of marijuana in binges, smoking daily for several weeks at a time with months in between binges. He acknowledges history of abuse of THC.  He notes some improvement in depressive symptoms from the SSRI and inpatient treatment.  He had previously been on Effexor and had been attending CBT psychotherapy weekly in past. His depression had some mild improvement during college since he had work to occupy his time and goals to accomplish, but depression became more severe after he graduated. There is family psychiatric history of alcoholism. Denies current SI/HI.    Collateral from father reveals no acute safety concerns. This is a 25 yo single  male, residing with family in [place], employed at [employer], past psychiatric history of Major Depressive Disorder, two past psychiatric hospitalizations, just discharged today from [facility] after hospitalized from 2/11-2/24/2020, prior admission on  [facility] 1/16-1/21/2020, significant past history of suicidal ideation and depression with past suicide attempt via Venlafaxine and Melatonin overdose which resulted in intubation, medical hospitalization prior to [facility] 2/11/20 hospitalization, with past medical history of dislocated right shoulder presenting to ER for evaluation to assess suicide risk after patient declined engagement in discharge plan of [facility] after intake completed today.  During inpatient stay patient was started on Zoloft titrated to 75mg qaM with reported adherance and well-tolerated. Inpatient medical records are reviewed and patient was on a 1:1 CO until 2/19 (because had a sling and hx of SIB, punching walls), but there were not episodes of SIB since coming off of C.O.  On exam patient is calm, cooperative and pleasant and denies depressed mood at this time, reports mood as "better" at this time with reported improvement during inpatient course.  Patient denies SI/I/P or HI. Denies delusions/AVH. Patient was earlier seen today by Dr. Carlisle as patient was presenting to [facility] after inpatient stay s/p suicide attempt by overdose on 2/7/2020, and sx of depression and maladaptive coping style and personality pathology with assessment reviewed and case is discussed with ER presentation consistent with earlier findings.  Although patient has a chronic elevated background risk, there is no evidence of an elevated acute risk of harm to self at this time.  Patient is offered voluntary admission and declines and does not warrant involuntary admission at this time. No access to guns or weapons, aware of safety plan completed with patient.     On evaluation, patient reported the realization that he wants a future, hopes to eventually have a family and plans to reengage in treatment with Dr. Smith as an outpatient.  Patient reports being remorseful that he attempted to suicide and reports it was a "wake up call."  Patient states he plans to return to work next week and hopes for a promotion, therefore declines to attend PHP.      Of note on first psychiatric hospitalization in 1/2020 patient was reportedly having suicidal ideation with plan but no intent, thinking about using a helium tank to suffocate himself or jump off a bridge.  Patient at that time decided to come to the [facility] ED and was admitted to [address] [place] [address] on a voluntary legal status.       Pt has a past history of depressed mood, significant anhedonia, sleep disturbance, guilt, concentration impairment and daily passive suicidal ideation with no intent but plans including using a helium tank and jumping off a bridge.  Reported history of SIB by slapping himself in the head due to feeling a need to be punished.  History of past use of marijuana in binges, smoking daily for several weeks at a time with months in between binges. He acknowledges history of abuse of THC.  He notes some improvement in depressive symptoms from the SSRI and inpatient treatment.  He had previously been on Effexor and had been attending CBT psychotherapy weekly in past. His depression had some mild improvement during college since he had work to occupy his time and goals to accomplish, but depression became more severe after he graduated. There is family psychiatric history of alcoholism. Denies current SI/HI.    Collateral from father reveals no acute safety concerns. This is a 23 yo single  male, residing with family in [place], employed at [employer], past psychiatric history of Major Depressive Disorder, two past psychiatric hospitalizations, just discharged today from [facility] after hospitalized from 2/11-2/24/2020, prior admission on  [facility] 1/16-1/21/2020, significant past history of suicidal ideation and depression with past suicide attempt via Venlafaxine and Melatonin overdose which resulted in intubation, medical hospitalization prior to [facility] 2/11/20 hospitalization, with past medical history of dislocated right shoulder presenting to ER for evaluation to assess suicide risk after patient declined engagement in discharge plan of [facility] after intake completed today.  During inpatient stay patient was started on Zoloft titrated to 75mg qaM with reported adherance which was well-tolerated.  Inpatient medical records are reviewed and patient was on a 1:1 CO until 2/19 (because had a sling and hx of SIB, punching walls), but there were not episodes of SIB since coming off of C.O.  On exam in ER patient is calm, cooperative and pleasant and denies depressed mood at this time, reports mood as "better" at this time with reported improvement during inpatient course.  Patient denies feelings of hopelessness and helplessness.  Patient denies SI/I/P or self injurious impulses or HI or violent thoughts.  Patient expresses future oriented thinking on exam.  No evidence of psychosis and patient denies delusions/AVH.  No signs or symptoms of manic/hypomanic symptoms, no lability or grandiosity noted on exam.  Patient was also seen earlier today by Dr. Carlisle as patient was presenting to [facility] after inpatient stay s/p suicide attempt by overdose.  Patient with documented history of depression and maladaptive coping style and personality pathology with ER presentation consistent with earlier findings at [facility] just earlier.  Although patient has a chronic elevated background risk, there is no evidence of an elevated acute risk of harm to self at this time.  Patient is offered voluntary admission and declines and does not warrant involuntary admission at this time. No access to guns or weapons, aware of safety plan completed with patient.     On evaluation, patient reported the realization that he wants a future, hopes to eventually have a family and plans to reengage in treatment with Dr. Smith as an outpatient.  Patient reports being remorseful that he previously attempted suicide on 2/7/20 and reports it was a "wake up call."  Patient states he plans to return to work next week and hopes for a promotion, therefore declines to attend PHP.      Of note on first psychiatric hospitalization in 1/2020 patient was reportedly having suicidal ideation with plan but no intent, thinking about using a helium tank to suffocate himself or jump off a bridge.  Patient at that time decided to come to the [facility] ED and was admitted to [address] [place] [address] on a voluntary legal status.  Patient reported his overdose was a "cry for help" which lead to recent hospitalization stemming from an expressed feeling of being "blocked," rejected by his friends.  Pt has a past history of depression which reportedly started with bullying from a teacher when he was in 5th grade.  Past history of periods of passive suicidal ideation noted but denies currently.  Patient with a past history of SIB by slapping himself in the head due to feeling a need to be punished but denies SIB or self injurious impulses currently.  Patient acknowledges history of abuse of THC but no use recently as was inpatient.  Patient notes symptomatic improvement in depressive symptoms from the SSRI (Zoloft) and inpatient treatment with discharge just earlier today.      Collateral from father reveals no acute safety concerns. This is a 23 yo single  male, residing with family in [place], employed at [employer], past psychiatric history of Major Depressive Disorder, two past psychiatric hospitalizations, just discharged today from [place] [address] after hospitalized from 2/11-2/24/2020, prior admission on  [facility] 1/16-1/21/2020, significant past history of suicidal ideation and depression with past suicide attempt via Venlafaxine and Melatonin overdose which resulted in intubation, medical hospitalization prior to [facility] 2/11/20 hospitalization, with past medical history of dislocated right shoulder presenting to ER for evaluation to assess risk after patient declined engagement in discharge plan of [facility] after intake completed today.  During inpatient stay patient was started on Zoloft titrated to 75mg qaM with reported adherance which was well-tolerated.  Inpatient medical records are reviewed and patient was on a 1:1 constant observation until 2/19 (because had a sling and hx of SIB, punching walls), but there were not episodes of SIB since.  On exam in ER patient is found to be calm, cooperative and pleasant and denies depressed mood at this time, reports mood as "better" with reported improvement during inpatient course.  Patient denies feelings of hopelessness and helplessness.  Patient denies SI/I/P or self injurious impulses or HI or violent thoughts.  Patient expresses future oriented thinking and is goal directed.  No evidence of psychosis noted on exam and patient denies delusions/AVH.  No signs or symptoms of manic/hypomanic symptoms, no lability or grandiosity noted on exam.  Patient was also seen earlier today by psychiatrist, Dr. Carlisle as patient was presenting to [facility] after inpatient discharge.  Patient with documented history of depression and maladaptive coping style and personality pathology with ER presentation consistent with earlier findings at [facility] just earlier.  Although patient has a chronic elevated background risk, there is no evidence of an elevated acute risk of harm to self at this time.  Patient is offered voluntary admission and declines and does not warrant involuntary admission at this time.  No access to guns or weapons, patient fully cooperates with referral back to [facility] with a written safety plan completed with patient.     On evaluation, patient reported the realization that he wants a future, hopes to eventually have a family and plans to reengage in treatment with Dr. Smith as an outpatient at [facility].  Patient reports being remorseful that he previously attempted suicide on 2/7/20 and reports it was a "wake up call."  Patient states he plans to return to work next week and hopes for a promotion, therefore declines to attend PHP which is recommended for an extended time period that would potentially interfere with occupational goals.      Of note on first psychiatric hospitalization as of 1/16/2020 patient was reportedly having suicidal ideation with plan but no intent, thinking about using a helium tank to suffocate himself or jump off a bridge.  Patient at that time was seen in the [facility] ED and was subsequently admitted to [address] [place] [address] on a voluntary legal status.  As of most recent hospitalization on 2/11/2020 patient reported his overdose was a "cry for help" stemming from an expressed feeling of being "blocked," rejected by his friends. Pt has a past history of depression which reportedly started with bullying from a teacher when he was in 5th grade.  There is a documented past history of expressed passive suicidal ideation but both passive and active suicidal ideation is denied currently.  Patient with a past history of SIB by slapping himself in the head due to feeling a need to be punished but denies SIB or self injurious impulses currently.  Patient acknowledges history of abuse of THC but no use recently as was inpatient and denies desire or plan to use THC.  Patient reports symptomatic improvement in his depressive symptoms from the SSRI (Zoloft) and inpatient treatment with discharge just earlier today at which time patient was cleared.      Collateral from father reveals no acute safety concerns and see  note for collateral.    Case is d/w Consuelo Vuong NP who reports decision was made to have patient brought to the ER for psychiatric evaluation as patient didn't comply with the inpatient team's recommended discharge plan for PHP and wanted to be sure patient was indeed stable and appropriate for alternative follow up plan.  Patient had denied This is a 23 yo single  male, residing with family in [place], employed at [employer], past psychiatric history of Major Depressive Disorder, two past psychiatric hospitalizations, just discharged today from [place] [id number] after hospitalized from 2/11-2/24/2020, prior admission on  [facility] 1/16-1/21/2020, significant past history of suicidal ideation and depression with past suicide attempt via Venlafaxine and Melatonin overdose which resulted in intubation, medical hospitalization prior to [facility] 2/11/20 hospitalization, with past medical history of dislocated right shoulder presenting to ER for evaluation to assess risk after patient declined engagement in discharge plan of [facility] after intake completed today.  During inpatient stay patient was started on Zoloft titrated to 75mg qaM with reported adherance which was well-tolerated.  Inpatient medical records are reviewed and patient was on a 1:1 constant observation until 2/19 (because had a sling and hx of SIB, punching walls), but there were not episodes of SIB since.  On exam in ER patient is found to be calm, cooperative and pleasant and denies depressed mood at this time, reports mood as "better" with reported improvement during inpatient course.  Patient denies feelings of hopelessness and helplessness.  Patient denies SI/I/P or self injurious impulses or HI or violent thoughts.  Patient expresses future oriented thinking and is goal directed.  No evidence of psychosis noted on exam and patient denies delusions/AVH.  No signs or symptoms of manic/hypomanic symptoms, no lability or grandiosity noted on exam.  Patient was also seen earlier today by psychiatrist, Dr. Carlisle as patient was presenting to [facility] after inpatient discharge.  Patient with documented history of depression and maladaptive coping style and personality pathology with ER presentation consistent with earlier findings at [facility] just earlier.  Although patient has a chronic elevated background risk, there is no evidence of an elevated acute risk of harm to self at this time.  Patient is offered voluntary admission and declines and does not warrant involuntary admission at this time.  No access to guns or weapons, patient fully cooperates with referral back to [facility] with a written safety plan completed with patient.     On evaluation, patient reported the realization that he wants a future, hopes to eventually have a family and plans to reengage in treatment with Dr. Smith as an outpatient at [facility].  Patient reports being remorseful that he previously attempted suicide on 2/7/20 and reports it was a "wake up call."  Patient states he plans to return to work next week and hopes for a promotion, therefore declines to attend PHP which is recommended for an extended time period that would potentially interfere with occupational goals.      Of note on first psychiatric hospitalization as of 1/16/2020 patient was reportedly having suicidal ideation with plan but no intent, thinking about using a helium tank to suffocate himself or jump off a bridge.  Patient at that time was seen in the [facility] ED and was subsequently admitted to [address] [place] [address] on a voluntary legal status.  As of most recent hospitalization on 2/11/2020 patient reported his overdose was a "cry for help" stemming from an expressed feeling of being "blocked," rejected by his friends. Pt has a past history of depression which reportedly started with bullying from a teacher when he was in 5th grade.  There is a documented past history of expressed passive suicidal ideation but both passive and active suicidal ideation is denied currently.  Patient with a past history of SIB by slapping himself in the head due to feeling a need to be punished but denies SIB or self injurious impulses currently.  Patient acknowledges history of abuse of THC but no use recently as was inpatient and denies desire or plan to use THC.  Patient reports symptomatic improvement in his depressive symptoms from the SSRI (Zoloft) and inpatient treatment with discharge just earlier today at which time patient was cleared.      Collateral from father reveals no acute safety concerns and see  note for collateral.    Case is d/w Consuelo Vuong NP who reports decision was made to have patient brought to the ER for psychiatric evaluation as patient didn't comply with the inpatient team's recommended discharge plan for PHP and wanted to be sure patient was indeed stable and appropriate for alternative follow up plan.  Patient had denied SIIP and HIIP at the time of discharge earlier today.    Case is d/w Dr. Fela Carlisle, [facility] PHP director who reports patient denied SI on intake and screened out after patient expressed preference to f/u at [facility] clinic for follow up instead of attending PHP.    Case is d/w KADEEM on inpatient unit and d/w Dr. Smith with appointment secured for follow up at [facility] clinic tomorrow at 9:30am. This is a 25 yo single  male, residing with family in [place], employed at [employer], past psychiatric history of Major Depressive Disorder, two past psychiatric hospitalizations, just discharged today from [facility] after hospitalized from 2/11-2/24/2020, prior admission on  [facility] 1/16-1/21/2020, significant past history of suicidal ideation and depression with past suicide attempt via Venlafaxine and Melatonin overdose which resulted in intubation, medical hospitalization prior to [facility] 2/11/20 hospitalization, with past medical history of dislocated right shoulder presenting to ER for evaluation to assess risk after patient declined engagement in discharge plan of [facility] after intake completed today.  During inpatient stay patient was started on Zoloft titrated to 75mg daily with reported adherance which was well-tolerated.  Inpatient medical records are reviewed and patient was on a 1:1 constant observation until 2/19 (because he had a sling and his hx of SIB, punching walls), but there were not episodes of SIB since.  On exam in ER patient is found to be calm, cooperative and pleasant and denies depressed mood at this time, reports mood as "better" with reported improvement during inpatient course.  Patient denies feelings of hopelessness and helplessness, denies sleep or appetite disturbances.  Patient denies SI/I/P or self injurious impulses or HI or violent thoughts.  Patient expresses future oriented thinking and is goal directed.  No evidence of psychosis noted on exam and patient denies AH/VH/TH, denies paranoia or delusions.  No signs or symptoms of manic/hypomanic symptoms, no lability or grandiosity noted on exam.  Patient was also seen earlier today by psychiatrist, Dr. Carlisle as patient was presenting to [facility] after inpatient discharge.  Patient with documented history of depression and maladaptive coping style and personality pathology with ER presentation consistent with earlier findings at [facility] just earlier.  Although patient has a chronic elevated background risk, there is no evidence of an elevated acute risk of harm to self at this time.  Patient is offered voluntary admission and declines and does not warrant involuntary admission at this time.  No access to guns or weapons, patient fully cooperates with referral back to [facility] with a written safety plan completed with patient.     On evaluation, patient reported the realization that he wants a future, hopes to eventually have a family and plans to reengage in treatment with Dr. Smith as an outpatient at [facility].  Patient reports being remorseful that he previously attempted suicide on 2/7/20 and reports it was a "wake up call."  Patient states he plans to return to work next week and hopes for a promotion, therefore declines to attend PHP which is recommended for an extended time period that would potentially interfere with occupational goals.      Of note on first psychiatric hospitalization as of 1/16/2020 patient was reportedly having suicidal ideation with plan but no intent, thinking about using a helium tank to suffocate himself or jump off a bridge.  Patient at that time was seen in the [facility] ED and was subsequently admitted to [address] [place] [address] on a voluntary legal status.  As of most recent hospitalization on 2/11/2020 patient reported his overdose was a "cry for help" stemming from an expressed feeling of being "blocked," rejected by his friends.  Pt has a past history of depression which reportedly started with bullying from a teacher when he was in 5th grade.  There is a documented past history of expressed passive suicidal ideation but both passive and active suicidal ideation is denied currently.  Patient with a past history of SIB by slapping himself in the head due to feeling a need to be punished but denies SIB or self injurious impulses currently.  Patient acknowledges history of abuse of THC but no use recently as was inpatient and denies desire or plan to use THC.  Patient reports symptomatic improvement in his depressive symptoms from the SSRI (Zoloft) and inpatient treatment with discharge just earlier today at which time patient was cleared.      Collateral from father reveals no acute safety concerns and see  note for collateral.    Case is d/w Consuelo Vuong NP who reports decision was made to have patient brought to the ER for psychiatric evaluation as patient didn't comply with the inpatient team's recommended discharge plan for PHP and wanted to be sure patient was indeed stable and appropriate for alternative follow up plan.  Patient had denied SIIP and HIIP at the time of discharge earlier today.    Case is d/w Dr. Fela Carlisle, [facility] PHP director who reports patient denied SI on intake and screened out after patient expressed preference to f/u at [facility] clinic for follow up instead of attending PHP.    Case is d/w KADEEM on inpatient unit and d/w Dr. Smith with appointment secured for follow up at [facility] clinic tomorrow at 9:30am.

## 2020-02-24 NOTE — ED ADULT NURSE NOTE - OBJECTIVE STATEMENT
pt d/c from [facility] today, brought to partial program, states partial program told him to return to [facility], then [facility] had him come to the ED for an eval. pt appears aox4, denies si/hi/ah/vh and illict drug use. calm, corporative and pleasant. pt undressed, placed in  clothing, belongings secured and cataloged. pending psych eval. will cont to monitor.

## 2020-02-24 NOTE — ED BEHAVIORAL HEALTH ASSESSMENT NOTE - PATIENT'S CHIEF COMPLAINT
"The discharge plan was to send me to partial program." "The discharge plan was to send me to the partial program."

## 2020-02-24 NOTE — ED PROVIDER NOTE - OBJECTIVE STATEMENT
This is a 24 yr old M, pmh anxiety depression, si and attempt. Pt sent in from [facility] program after being discharge today from [facility]. Pt states he went to the program today after his discharge. They said to him, he does not need to be there. When he went to  his belonging at the hospital they told him, he can not leave and needs to come to the er, for psych eval. Upon arrival, a&ox4 with steady gait. Denies SI/HI/AH/VH. Denies falling, punching or kicking any objects. Denies pain, SOB, fever, chills, chest and abdominal discomfort. Denies recent use of alcohol or illicit drug. No evidence of physical injuries.

## 2020-02-24 NOTE — ED BEHAVIORAL HEALTH ASSESSMENT NOTE - DIFFERENTIAL
Major depressive disorder without psychosis  Bipolar 1 disorder  Substance-induced mood episode Major depressive disorder without psychosis  Bipolar 1 disorder, MRE depressed without psychosis

## 2020-02-25 DIAGNOSIS — R69 ILLNESS, UNSPECIFIED: ICD-10-CM

## 2020-02-25 NOTE — ED BEHAVIORAL HEALTH NOTE - BEHAVIORAL HEALTH NOTE
High Risk Log: Writer received a return call from patient stating he will follow up at [facility] today at 9am

## 2020-02-26 ENCOUNTER — APPOINTMENT (OUTPATIENT)
Dept: ORTHOPEDIC SURGERY | Facility: HOSPITAL | Age: 25
End: 2020-02-26

## 2020-03-02 ENCOUNTER — APPOINTMENT (OUTPATIENT)
Dept: ORTHOPEDIC SURGERY | Facility: CLINIC | Age: 25
End: 2020-03-02
Payer: COMMERCIAL

## 2020-03-02 VITALS — BODY MASS INDEX: 39.17 KG/M2 | WEIGHT: 315 LBS | HEIGHT: 75 IN

## 2020-03-02 PROCEDURE — 99203 OFFICE O/P NEW LOW 30 MIN: CPT

## 2020-03-03 NOTE — PHYSICAL EXAM
[de-identified] : General: well-appearing, not in acute distress and not obese. \par Gait: normal. \par Oriented to time, place, person\par Mood: Normal\par Affect: Normal\par \par Right shoulder exam\par \par Inspection: No malalignment, No defects, No atrophy\par Skin: No masses, No lesions\par Neck: Negative Spurling's, full ROM, no pain with ROM\par AROM: FF to 180, abduction to 90, ER to 80, IR to mid lumbar\par Painful arc ROM: ABD, ER\par Tenderness: no bicipital tenderness, no tenderness to the greater tuberosity/RTC insertion, + anterior shoulder/lesser tuberosity tenderness\par Strength: 4.5/5 ER, 5/5 IR in adduction, 4/5 supraspinatus testing, negative Blaine's test\par AC Joint: No ttp/pain with cross arm testing\par Biceps: Speed Negative, Yergusons Negative\par Impingement test: Negative Todd, Negative Neer \par Stability: + apprehension sign, significant guarding with load and shift testing\par Vasc: 2+ radial pulse\par Neuro: AIN, PIN, Ulnar nerve intact to motor\par Sensation: Intact to light touch throughout [de-identified] : Images were reviewed from [facility] dated 2.7.20. \par \par Multiple images right shoulder evidence of anterior shoulder dislocation with small bony Bankart fracture. There is no underlying degenerative arthritic change seen. Overall alignment is maintained. Otherwise unremarkable.  Subsequent concentric reduction right shoulder.\par \par CT right shoulder dated 2.11.20 shows evidence of a large Hill-Sachs deformity, as well as an acute osseous Bankart lesion involving approximately 20% of the anterior glenoid.

## 2020-03-03 NOTE — DISCUSSION/SUMMARY
[de-identified] : 24 year old male presents with right shoulder dislocation.\par \par Patient has a highly unstable shoulder with bony injury to the posterior humeral head as well as anterior glenoid.  Discussed with the patient that this is a concern for high likelihood of recurrent dislocation and injury.  Consideration would be for surgical stabilization of the right shoulder.  Would recommend MRI imaging of the right shoulder to further delineate degree of internal derangement.  In addition, discussed potential need for neurologic work-up for seizure activity given unclear diagnosis.  Discussed that if he were to have additional seizure events following surgical stabilization, he would be putting surgical reconstruction at risk.\par \par Recommendations: Neurology f/u, MRI Rx given to patient to further delineate any internal structural derangement to the shoulder. \par  \par Followup: After MRI / Neuro w/u

## 2020-03-03 NOTE — HISTORY OF PRESENT ILLNESS
[de-identified] : 24 year old male works as  presents today s/p right shoulder dislocation 2/7/20. He dislocated his shoulder during a seizure. He was taken to [facility] where his shoulder was reduced and placed in a sling. First time patient experienced seizure. Cause is unknown at this time but assumed to be induced by anxiety medication. First time dislocation to the right shoulder. The pain is brought on with external and internal rotation. He is not taking pain medication. Denies numbness or tingling. \par \par The patient's past medical history, past surgical history, medications and allergies were reviewed by me today with the patient and documented accordingly. In addition, the patient's family and social history, which were noncontributory to this visit, were reviewed also.

## 2020-03-03 NOTE — ADDENDUM
[FreeTextEntry1] : This note was written by Kenyatta Antony on 03/02/2020 acting solely as a scribe for Dr. Adriel Hein.\par \par All medical record entries made by the Scribe were at my, Dr. Adriel Hein, direction and personally dictated by me on 03/02/2020. I have personally reviewed the chart and agree that the record accurately reflects my personal performance of the history, physical exam, assessment and plan.\par

## 2020-03-09 ENCOUNTER — FORM ENCOUNTER (OUTPATIENT)
Age: 25
End: 2020-03-09

## 2020-03-10 ENCOUNTER — APPOINTMENT (OUTPATIENT)
Dept: MRI IMAGING | Facility: CLINIC | Age: 25
End: 2020-03-10
Payer: COMMERCIAL

## 2020-03-10 ENCOUNTER — OUTPATIENT (OUTPATIENT)
Dept: OUTPATIENT SERVICES | Facility: HOSPITAL | Age: 25
LOS: 1 days | End: 2020-03-10
Payer: COMMERCIAL

## 2020-03-10 DIAGNOSIS — Z00.00 ENCOUNTER FOR GENERAL ADULT MEDICAL EXAMINATION WITHOUT ABNORMAL FINDINGS: ICD-10-CM

## 2020-03-10 PROCEDURE — 73221 MRI JOINT UPR EXTREM W/O DYE: CPT

## 2020-03-10 PROCEDURE — 73221 MRI JOINT UPR EXTREM W/O DYE: CPT | Mod: 26,RT

## 2020-03-23 DIAGNOSIS — F32.9 MAJOR DEPRESSIVE DISORDER, SINGLE EPISODE, UNSPECIFIED: ICD-10-CM

## 2020-05-20 ENCOUNTER — APPOINTMENT (OUTPATIENT)
Dept: ORTHOPEDIC SURGERY | Facility: CLINIC | Age: 25
End: 2020-05-20
Payer: COMMERCIAL

## 2020-05-20 VITALS — TEMPERATURE: 98.2 F

## 2020-05-20 DIAGNOSIS — S42.151D DISPLACED FRACTURE OF GLENOID CAVITY OF SCAPULA, RIGHT SHOULDER, SUBSEQUENT ENCOUNTER FOR FRACTURE WITH ROUTINE HEALING: ICD-10-CM

## 2020-05-20 DIAGNOSIS — S43.004D UNSPECIFIED DISLOCATION OF RIGHT SHOULDER JOINT, SUBSEQUENT ENCOUNTER: ICD-10-CM

## 2020-05-20 DIAGNOSIS — M21.821 OTHER SPECIFIED ACQUIRED DEFORMITIES OF RIGHT UPPER ARM: ICD-10-CM

## 2020-05-20 DIAGNOSIS — S42.141D DISPLACED FRACTURE OF GLENOID CAVITY OF SCAPULA, RIGHT SHOULDER, SUBSEQUENT ENCOUNTER FOR FRACTURE WITH ROUTINE HEALING: ICD-10-CM

## 2020-05-20 PROCEDURE — 99214 OFFICE O/P EST MOD 30 MIN: CPT

## 2020-05-20 NOTE — ED ADULT NURSE NOTE - MOOD
[facility]   [address] [place] [address]  [phone]  05/20/20    Celine Koch  [address] [facility] [address]      Dear Celine    Your test results from your last visit have returned.  Your lab work was all normal. If you have any further questions, please feel free to call.   Nurse Only on 05/20/2020   Component Date Value Ref Range Status   • URINE PREGNANCY,QUAL 05/20/2020 Negative  Negative, Not Detected, Detected Final           Sincerely,      Angelique Bahena MD   
Depressed/Anxious

## 2020-05-26 PROBLEM — M21.821 HILL SACHS DEFORMITY, RIGHT: Status: ACTIVE | Noted: 2020-03-03

## 2020-05-26 PROBLEM — S42.141D: Status: ACTIVE | Noted: 2020-03-03

## 2020-05-26 PROBLEM — S43.004D CLOSED DISLOCATION OF RIGHT SHOULDER, SUBSEQUENT ENCOUNTER: Status: ACTIVE | Noted: 2020-03-03

## 2020-05-26 NOTE — HISTORY OF PRESENT ILLNESS
[de-identified] : 24 year old male presents today s/p right shoulder dislocation 2/7/20. MRI showed evidence of Hill-Sachs deformity and small osseous Bankart fracture with circumfrential labral injury. He dislocated his shoulder during a seizure.  First time patient experienced seizure. Has not seen Neurology, but has been following up with Psychiatry.  Minimal pain at this time, and no evidence of residual instability.  Has not been undergoing any physical therapy.  Denies numbness or tingling. \par \par

## 2020-05-26 NOTE — PHYSICAL EXAM
[de-identified] : General: well-appearing, not in acute distress and not obese. \par Gait: normal. \par Oriented to time, place, person\par Mood: Normal\par Affect: Normal\par \par Right shoulder exam\par \par Inspection: No malalignment, No defects, No atrophy\par Skin: No masses, No lesions\par Neck: Negative Spurling's, full ROM, no pain with ROM\par AROM: FF to 180, abduction to 90, ER to 80, IR to mid lumbar\par Painful arc ROM: Mild ABD, ER\par Tenderness: no bicipital tenderness, no tenderness to the greater tuberosity/RTC insertion, mild anterior shoulder/lesser tuberosity tenderness\par Strength: 5/5 ER, 5/5 IR in adduction, 4+/5 supraspinatus testing, negative Huron's test\par AC Joint: No ttp/pain with cross arm testing\par Biceps: Speed Negative, Yergusons Negative\par Impingement test: Negative Todd, Negative Neer \par Stability: No apprehension sign, 1+ anterior load and shift testing\par Vasc: 2+ radial pulse\par Neuro: AIN, PIN, Ulnar nerve intact to motor\par Sensation: Intact to light touch throughout [de-identified] : Images were reviewed from [facility] dated 2.7.20. \par \par Multiple images right shoulder evidence of anterior shoulder dislocation with small bony Bankart fracture. There is no underlying degenerative arthritic change seen. Overall alignment is maintained. Otherwise unremarkable.  Subsequent concentric reduction right shoulder.\par \par CT right shoulder dated 2.11.20 shows evidence of a large Hill-Sachs deformity, as well as an acute osseous Bankart lesion involving approximately 20% of the anterior glenoid.\par \par MRI right shoulder dated 3.10.20 shows evidence of Hill-Sachs deformity and osseous Bankart fracture. Near circumferential tear of the glenoid labrum.\par

## 2020-05-26 NOTE — DISCUSSION/SUMMARY
[de-identified] : 24 year old male presents with right shoulder dislocation.\par \par The patient has a highly unstable shoulder with evidence of bony injury through the anterior shoulder as well as the posterior humerus.  There is a high-grade injury through the labrum also.  I discussed that this will likely render the shoulder unstable and there is a high risk of redislocation.  Discussed indications for surgical intervention that would include further unstable events, or continued pain and dysfunction to the right shoulder with sensation of instability.  Also discussed recommendations for continued monitoring for seizure activity.  \par \par Given the pandemic, surgical intervention was delayed/postponed.  At this time, given relative painless range of motion and function and low activity level, discussed ability to continue conservative management at this time and for reevaluation on a as needed basis if he would like to proceed with surgical stabilization.\par \par Recommendations: PT for strengthening conditioning, avoidance abduction external rotation.  Follow-up psych.\par  \par Followup: As needed

## 2021-01-14 NOTE — ED BEHAVIORAL HEALTH ASSESSMENT NOTE - PRIMARY DX
Detail Level: Simple
Detail Level: Zone
Detail Level: Generalized
Severe episode of recurrent major depressive disorder, without psychotic features

## 2021-03-21 ENCOUNTER — TRANSCRIPTION ENCOUNTER (OUTPATIENT)
Age: 26
End: 2021-03-21

## 2021-08-26 NOTE — ED PROVIDER NOTE - RATE
Chief Complaint   Patient presents with   • Follow-up     1 wk f/u on blisters from wasp sting. Pt's wounds seem to be healing well.        History Of Present Illness  Chiara is a 76 year old female presenting for wound check.  Chiara is a very pleasant lady who saw me last time after a bite.  Patient develop to wound on the lower right extremity around ankle.  Started on antibiotic because of suspicion of infection.  Today she is here for follow-up.  Both wounds/ blisters  seems great no surrounding cellulitis but mild swelling at ankle noted which is much better than before.  Patient denied any itching pain discharge redness around the wound or any GI/ symptoms.  She is able to walk tolerating antibiotics well and tomorrow is the last day of antibiotics.        Past Medical History  Past Medical History:   Diagnosis Date   • Atypical ductal hyperplasia of left breast    • Essential (primary) hypertension    • Intraductal papilloma of left breast    • Malignant neoplasm (CMS/HCC)    • Osteopenia    • Thyroid disease         Surgical History  Past Surgical History:   Procedure Laterality Date   • Breast lumpectomy Left 12/10/2018   • Foot fracture surgery Left 01/2017    Ankle   • Thyroidectomy          Social History  Social History     Tobacco Use   • Smoking status: Former Smoker   • Smokeless tobacco: Never Used   Vaping Use   • Vaping Use: never used   Substance Use Topics   • Alcohol use: No     Comment: Rare   • Drug use: No       Family History  Family History   Problem Relation Age of Onset   • Cancer, Breast Mother         Allergies  ALLERGIES:  Amlodipine besylate and Atorvastatin calcium    Medications  Current Outpatient Medications   Medication   • sulfamethoxazole-trimethoprim (BACTRIM DS) 800-160 MG per tablet   • levothyroxine 100 MCG tablet   • levothyroxine 88 MCG tablet   • tiZANidine (ZANAFLEX) 2 MG tablet   • metoPROLOL succinate (TOPROL-XL) 25 MG 24 hr tablet   • hydrochlorothiazide  (MICROZIDE) 12.5 MG capsule   • Choline Fenofibrate (Fenofibric Acid) 135 MG CAPSULE DELAYED RELEASE   • Cholecalciferol (Vitamin D) 50 mcg (2,000 units) tablet   • Vitamin B Complex-C Cap   • ALPRAZolam (Xanax) 0.25 MG tablet     No current facility-administered medications for this visit.       Patient Active Problem List   Diagnosis   • Atypical ductal hyperplasia of left breast   • Intraductal papilloma of left breast   • Bilateral hearing loss   • Bilateral impacted cerumen        Review of Systems    CONSTITUTIONAL: No unwanted weight change , fever , chills or malaise .  EYES: No change in vision  ENT:No problem  hearing,chewing, swallowing.  RESPIRATORY:No cough ,SOB,H/of lung disorder  CV:No chest pain or pressure,SOUZA,Orthopnea, PND ,or palpitations.  GI:No change in bowel habits, no nausea ,vomiting or diarrhea  :No dysuria, hematuria, nocturia,urgency or frequency  MUSCULO-SKELETAL:no acute complaints  SKIN:see hpi   CNS:No problems with co ordination , balance , vision , strength, numbness or tingling .  PSYCH:No depression /anxiety.    Last Recorded Vitals    Blood pressure 126/62, pulse 70, temperature 97.3 °F (36.3 °C), temperature source Temporal, resp. rate 14, height 5' 4.25\" (1.632 m), weight 61.7 kg (136 lb), SpO2 97 %.  Physical  General:Well oriented in time /place /person    HEENT: Conjunctiva clear,NECK:SuppleLUNGS:LUNGS CLEAR , NORMAL BREATH SOUNDS , No wheezing ,rales or Rhonchi noted .  CARDIOVASCULAR:RRR, Heart sounds normal   ABDOMEN: Abdomen Soft , non tender , positive bowel sounds in all quadrant noted   EXTREMITIES:No clubbing , cyanosis , very mild ankle edema noted   MUSCULOSKELETAL:ROM wnl at rt ankle / rt lower ext   SKIN:2 blisters on medial rt lower leg at ankle noted , covered with dry / healed skin , no cellulitis / discharge or pain noted .   NEUROLOGICAL:Intact , Mental status normal , Gait normal,Muscle strength 5/5 throughout , sensation grossly intact    Assessment and  Plan:    Visit for wound check  Wound heal very well   Finishing antibiotics   Or instructions discussed recommended to put Vaseline over the blisters.  Follow-up if get worse.  Recommend  Allegra/antiallergy medication for mild  ankle swelling    Bee sting, accidental or unintentional, subsequent encounter  Symptoms improved   rec anti allergy             Time spent with patient 20  minutes.Duration of counseling and/or coordination of care (greater than 50%).Specifically,we discussed the patient's diagnosis,prognosis,and treatment/management options as documented in the A/P.    Evan Casillas MD  Internal Medicine  [facility]  Phone: [phone]  Fax: [phone]   113

## 2022-04-07 NOTE — ED BEHAVIORAL HEALTH ASSESSMENT NOTE - RELATEDNESS
Problem: Prexisting or High Potential for Compromised Skin Integrity  Goal: Skin integrity is maintained or improved  Description: INTERVENTIONS:  - Identify patients at risk for skin breakdown  - Assess and monitor skin integrity  - Assess and monitor nutrition and hydration status  - Monitor labs   - Assess for incontinence   - Turn and reposition patient  - Assist with mobility/ambulation  - Relieve pressure over bony prominences  - Avoid friction and shearing  - Provide appropriate hygiene as needed including keeping skin clean and dry  - Evaluate need for skin moisturizer/barrier cream  - Collaborate with interdisciplinary team   - Patient/family teaching  - Consider wound care consult   Outcome: Progressing     Problem: Potential for Falls  Goal: Patient will remain free of falls  Description: INTERVENTIONS:  - Educate patient/family on patient safety including physical limitations  - Instruct patient to call for assistance with activity   - Consult OT/PT to assist with strengthening/mobility   - Keep Call bell within reach  - Keep bed low and locked with side rails adjusted as appropriate  - Keep care items and personal belongings within reach  - Initiate and maintain comfort rounds  - Make Fall Risk Sign visible to staff  - Apply yellow socks and bracelet for high fall risk patients  - Consider moving patient to room near nurses station  Outcome: Progressing Good

## 2023-03-22 NOTE — ED ADULT NURSE NOTE - AFFECT QUALITY
Ear Cerumen Removal    Date/Time: 3/22/2023 10:34 AM  Performed by: Isabel Reyes, CMA  Authorized by: Caleb Nicholson MD     Consent:     Consent obtained:  Verbal    Consent given by:  Patient and healthcare agent    Risks, benefits, and alternatives were discussed: yes      Risks discussed:  Bleeding, dizziness, incomplete removal and pain    Alternatives discussed:  Observation  Universal protocol:     Procedure explained and questions answered to patient or proxy's satisfaction: yes      Relevant documents present and verified: yes      Test results available: no      Imaging studies available: no      Required blood products, implants, devices, and special equipment available: no      Site/side marked: no      Immediately prior to procedure, a time out was called: no      Patient identity confirmed:  Verbally with patient  Procedure details:     Location:  L ear    Procedure type: irrigation      Procedure outcomes: cerumen removed         Depressed/Irritable

## 2023-08-10 NOTE — CONSULT NOTE ADULT - SUBJECTIVE AND OBJECTIVE BOX
MEDICAL TOXICOLOGY CONSULT    HPI:  24M PMH depression presented to ED after overdose.  Patient was in process of intubation so history obtained from ED and provider notes.  Patient overdosed on fifteen tablets 150mg venlafaxine and fifteen tablets 3mg melatonin.  Overdose occurred at 1pm.  Patient reportedly went to sleep and woke up and attempted to self induce vomiting with bilious emesis but no pills or pill fragments noted.  Patient reported this was an attempt to hurt himself.      In the ED, patient noted to be tachycardic with shoulder dislocation.  Patient did not have tongue biting or urinary incontinence but given overdose of venlafaxine, patient was intubated and shoulder reduced.  OG tube was placed and polyethylene glycol ordered for whole bowel irrigation.      ONSET / TIME of exposure(s): 1pm, 2/7/20    QUANTITY of exposure(s): fifteen tablets 150mg venlafaxine and fifteen tablets 3mg melatonin    ROUTE of exposure: ingestion    CONTEXT of exposure: at home    ASSOCIATED symptoms: self induced vomiting at home      PAST MEDICAL & SURGICAL HISTORY:  Depression, unspecified depression type  Anxiety  No significant past surgical history        MEDICATION HISTORY:  chlorhexidine 0.12% Liquid 15 milliLiter(s) Oral Mucosa every 12 hours  fentaNYL   Infusion. 0.5 MICROgram(s)/kG/Hr IV Continuous <Continuous>  midazolam Infusion 0.02 mG/kG/Hr IV Continuous <Continuous>  polyethylene glycol/electrolyte Solution. 4000 milliLiter(s) Oral once  sodium phosphate IVPB 15 milliMole(s) IV Intermittent once      RECREATIONAL / ETHANOL / SUPPLEMENT USE: unknown      FAMILY HISTORY:  No pertinent family history in first degree relatives      REVIEW OF SYSTEMS:  unable to perform as patient is intubated and sedated      PHYSICAL EXAM  Vital Signs Last 24 Hrs  T(C): 36.6 (07 Feb 2020 16:56), Max: 36.6 (07 Feb 2020 16:56)  T(F): 97.8 (07 Feb 2020 16:56), Max: 97.8 (07 Feb 2020 16:56)  HR: 114 (07 Feb 2020 16:56) (114 - 114)  BP: 155/100 (07 Feb 2020 16:56) (155/100 - 155/100)  BP(mean): --  RR: 17 (07 Feb 2020 16:56) (17 - 17)  SpO2: 100% (07 Feb 2020 16:56) (100% - 100%)    General:  intubated and sedated  Head:  normocephalic & atraumatic  Eyes:  extra-occular movement is intact  Pupils:  3 mm, symmetric, reactive to light  Ear, nose, throat:  mucosa is moist, +ETT, +OGT  Neck:  supple  Respiratory:  clear to auscultation bilaterally, no rales/ronchi/wheezing  Cardiac:  rate is tachycardic, normal rhythm, no rubs/murmurs/gallops  Abdomen:  Soft, nondistended, nontender, +bowel sounds, no organomegaly  :  deferred  Skin:  warm and dry  Neurologic:  no clonus, extremities are supple    SIGNIFICANT LABORATORY STUDIES:                        16.2   20.60 )-----------( 300      ( 07 Feb 2020 18:47 )             48.3       02-07    137  |  100  |  12  ----------------------------<  125<H>  3.5   |  23  |  1.02    Ca    9.6      07 Feb 2020 18:47  Phos  1.3     02-07  Mg     2.7     02-07    TPro  8.5<H>  /  Alb  4.7  /  TBili  0.9  /  DBili  x   /  AST  25  /  ALT  29  /  AlkPhos  103  02-07              Anion Gap: 14 02-07 @ 18:47  CK: -- 02-07 @ 18:47  Troponin:  --  02-07 @ 18:47  Pro-BNP:  --  02-07 @ 18:47  VBG:  --  02-07 @ 18:47  Carboxyhemoglobin %:  --  02-07 @ 18:47  Methemoglobin %:  --  02-07 @ 18:47  Osmolality Serum:  --  02-07 @ 18:47  Aspirin Level: < 5.0<L>  02-07 @ 18:47  Acetaminophen Level:  < 15.0<L>  02-07 @ 18:47  Ethanol Level:  < 10  02-07 @ 18:47  Digoxin Level:  --  02-07 @ 18:47  Phenytoin Level:  --  02-07 @ 18:47  Carbamazepine level:  --  02-07 @ 18:47  Lamotrigine level:  --  02-07 @ 18:47      Initial ECG: SR 100bpm, QRS 94, QTc 456  Repeat ECG after intubation: SR 113bpm, QRS 96, QTc 452 MEDICAL TOXICOLOGY CONSULT    HPI:  24M PMH depression presented to ED after overdose.  Patient was in process of intubation so history obtained from ED and provider notes.  Patient overdosed on fifteen tablets 150mg venlafaxine and fifteen tablets 3mg melatonin.  Overdose occurred at 1pm.  Patient reportedly went to sleep and woke up and attempted to self induce vomiting with bilious emesis but no pills or pill fragments noted.  Patient reported this was an attempt to hurt himself.      In the ED, patient noted to be tachycardic with shoulder dislocation.  Patient did not have tongue biting or urinary incontinence but given overdose of venlafaxine, patient was intubated and shoulder reduced.  OG tube was placed and polyethylene glycol ordered for whole bowel irrigation.      ONSET / TIME of exposure(s): 1pm, 2/7/20    QUANTITY of exposure(s): fifteen tablets 150mg venlafaxine and fifteen tablets 3mg melatonin    ROUTE of exposure: ingestion    CONTEXT of exposure: at home    ASSOCIATED symptoms: self induced vomiting at home      PAST MEDICAL & SURGICAL HISTORY:  Depression, unspecified depression type  Anxiety  No significant past surgical history        MEDICATION HISTORY:  chlorhexidine 0.12% Liquid 15 milliLiter(s) Oral Mucosa every 12 hours  fentaNYL   Infusion. 0.5 MICROgram(s)/kG/Hr IV Continuous <Continuous>  midazolam Infusion 0.02 mG/kG/Hr IV Continuous <Continuous>  polyethylene glycol/electrolyte Solution. 4000 milliLiter(s) Oral once  sodium phosphate IVPB 15 milliMole(s) IV Intermittent once      RECREATIONAL / ETHANOL / SUPPLEMENT USE: unknown      FAMILY HISTORY:  No pertinent family history in first degree relatives      REVIEW OF SYSTEMS:  unable to perform as patient is intubated and sedated      PHYSICAL EXAM  Vital Signs Last 24 Hrs  T(C): 36.6 (07 Feb 2020 16:56), Max: 36.6 (07 Feb 2020 16:56)  T(F): 97.8 (07 Feb 2020 16:56), Max: 97.8 (07 Feb 2020 16:56)  HR: 114 (07 Feb 2020 16:56) (114 - 114)  BP: 155/100 (07 Feb 2020 16:56) (155/100 - 155/100)  BP(mean): --  RR: 17 (07 Feb 2020 16:56) (17 - 17)  SpO2: 100% (07 Feb 2020 16:56) (100% - 100%)    General:  intubated and sedated  Head:  normocephalic & atraumatic  Pupils:  3 mm, symmetric, reactive to light  Ear, nose, throat:  mucosa is moist, +ETT, +OGT  Neck:  supple  Respiratory:  clear to auscultation bilaterally, no rales/ronchi/wheezing  Cardiac:  rate is tachycardic, normal rhythm, no rubs/murmurs/gallops  Abdomen:  Soft, nondistended, nontender, +bowel sounds, no organomegaly  :  deferred  Skin:  warm and dry  Neurologic:  no clonus, extremities are supple    SIGNIFICANT LABORATORY STUDIES:                        16.2   20.60 )-----------( 300      ( 07 Feb 2020 18:47 )             48.3       02-07    137  |  100  |  12  ----------------------------<  125<H>  3.5   |  23  |  1.02    Ca    9.6      07 Feb 2020 18:47  Phos  1.3     02-07  Mg     2.7     02-07    TPro  8.5<H>  /  Alb  4.7  /  TBili  0.9  /  DBili  x   /  AST  25  /  ALT  29  /  AlkPhos  103  02-07              Anion Gap: 14 02-07 @ 18:47  CK: -- 02-07 @ 18:47  Troponin:  --  02-07 @ 18:47  Pro-BNP:  --  02-07 @ 18:47  VBG:  --  02-07 @ 18:47  Carboxyhemoglobin %:  --  02-07 @ 18:47  Methemoglobin %:  --  02-07 @ 18:47  Osmolality Serum:  --  02-07 @ 18:47  Aspirin Level: < 5.0<L>  02-07 @ 18:47  Acetaminophen Level:  < 15.0<L>  02-07 @ 18:47  Ethanol Level:  < 10  02-07 @ 18:47  Digoxin Level:  --  02-07 @ 18:47  Phenytoin Level:  --  02-07 @ 18:47  Carbamazepine level:  --  02-07 @ 18:47  Lamotrigine level:  --  02-07 @ 18:47      Initial ECG: SR 100bpm, QRS 94, QTc 456  Repeat ECG after intubation: SR 113bpm, QRS 96, QTc 452 fall

## 2023-09-07 NOTE — ED ADULT NURSE NOTE - GROOMING
Is the patient due for a refill? Yes    Was the patient seen the past year? Yes    Date of last office visit: 5/22/23    Does the patient have an upcoming appointment?  Yes   If yes, When? 11/14/23    Provider to refill:SS    Does the patients insurance require a 100 day supply?  No     Good

## 2024-03-11 NOTE — ED BEHAVIORAL HEALTH ASSESSMENT NOTE - FUND OF KNOWLEDGE
[FreeTextEntry1] : General: Awake, Alert, No Acute Distress Respiratory: Normal Respiratory Effort Abdomen: Soft, non-tender, non-distended 
Normal

## 2024-11-13 NOTE — ED ADULT NURSE NOTE - NSFALLRSKASSESSDT_ED_ALL_ED
Medication:   Requested Prescriptions     Pending Prescriptions Disp Refills    rosuvastatin (CRESTOR) 40 MG tablet [Pharmacy Med Name: ROSUVASTATIN CALCIUM 40 MG TAB] 90 tablet 3     Sig: TAKE 1 TABLET BY MOUTH EVERY DAY        Last Filled:      Patient Phone Number: [phone] (home) [phone] (work)    Last appt: 10/27/2023   Next appt: Visit date not found    Last OARRS:        No data to display                   07-Feb-2020 19:41